# Patient Record
Sex: FEMALE | Race: WHITE | NOT HISPANIC OR LATINO | Employment: FULL TIME | URBAN - METROPOLITAN AREA
[De-identification: names, ages, dates, MRNs, and addresses within clinical notes are randomized per-mention and may not be internally consistent; named-entity substitution may affect disease eponyms.]

---

## 2017-01-11 ENCOUNTER — TELEPHONE (OUTPATIENT)
Dept: SURGERY | Facility: CLINIC | Age: 57
End: 2017-01-11

## 2017-01-11 DIAGNOSIS — C25.9 MALIGNANT NEOPLASM OF PANCREAS, UNSPECIFIED LOCATION OF MALIGNANCY: Primary | ICD-10-CM

## 2017-01-11 NOTE — TELEPHONE ENCOUNTER
----- Message from Sandra Marshall sent at 1/11/2017  2:12 PM CST -----  Contact: Sommer / dr. sanderson office  Sommer States that they need a call returned by a nurse in reference to Citlali. Please call Sommer @ [phone] . Thanks :)

## 2017-01-31 ENCOUNTER — OFFICE VISIT (OUTPATIENT)
Dept: SURGERY | Facility: CLINIC | Age: 57
End: 2017-01-31
Payer: COMMERCIAL

## 2017-01-31 ENCOUNTER — HOSPITAL ENCOUNTER (OUTPATIENT)
Dept: RADIOLOGY | Facility: HOSPITAL | Age: 57
Discharge: HOME | End: 2017-01-31
Attending: SURGERY
Payer: COMMERCIAL

## 2017-01-31 VITALS
WEIGHT: 121.5 LBS | HEART RATE: 98 BPM | BODY MASS INDEX: 24.53 KG/M2 | DIASTOLIC BLOOD PRESSURE: 72 MMHG | TEMPERATURE: 97 F | SYSTOLIC BLOOD PRESSURE: 114 MMHG

## 2017-01-31 DIAGNOSIS — C25.0 MALIGNANT NEOPLASM OF HEAD OF PANCREAS: Primary | ICD-10-CM

## 2017-01-31 DIAGNOSIS — C25.9 MALIGNANT NEOPLASM OF PANCREAS, UNSPECIFIED LOCATION OF MALIGNANCY: ICD-10-CM

## 2017-01-31 PROCEDURE — 3074F SYST BP LT 130 MM HG: CPT | Mod: S$GLB,,, | Performed by: SURGERY

## 2017-01-31 PROCEDURE — 3078F DIAST BP <80 MM HG: CPT | Mod: S$GLB,,, | Performed by: SURGERY

## 2017-01-31 PROCEDURE — 99213 OFFICE O/P EST LOW 20 MIN: CPT | Mod: S$GLB,,, | Performed by: SURGERY

## 2017-01-31 PROCEDURE — 25500020 PHARM REV CODE 255: Performed by: SURGERY

## 2017-01-31 PROCEDURE — 71260 CT THORAX DX C+: CPT | Mod: TC

## 2017-01-31 PROCEDURE — 71260 CT THORAX DX C+: CPT | Mod: 26,,, | Performed by: RADIOLOGY

## 2017-01-31 PROCEDURE — 74177 CT ABD & PELVIS W/CONTRAST: CPT | Mod: TC

## 2017-01-31 PROCEDURE — 74177 CT ABD & PELVIS W/CONTRAST: CPT | Mod: 26,,, | Performed by: RADIOLOGY

## 2017-01-31 PROCEDURE — 99999 PR PBB SHADOW E&M-EST. PATIENT-LVL III: CPT | Mod: PBBFAC,,, | Performed by: SURGERY

## 2017-01-31 RX ORDER — INSULIN LISPRO 100 [IU]/ML
INJECTION, SOLUTION INTRAVENOUS; SUBCUTANEOUS
COMMUNITY

## 2017-01-31 RX ADMIN — IOHEXOL 75 ML: 350 INJECTION, SOLUTION INTRAVENOUS at 12:01

## 2017-01-31 NOTE — LETTER
[facility]  [address]  [facility] [address]  Phone: [phone] February 6, 2017        Cristobal Garrison MD  [address]  [address]  [facility] [address]    Patient: Citlali Rose   MR Number: [id number]   YOB: 1960   Date of Visit: 1/31/2017     Dear Dr. Garrison:    Thank you for referring Citlali Rose to me for evaluation.     I saw Ms. Rose in clinic and reviewed her updated imaging.  We are starting her on a prehab program and tentatively planning for Whipple procedure in early March.    If you have questions, please do not hesitate to call me. Thank you again for referring Ms. Rose.    Sincerely,      EMMANUELLE Winston MD  Surgical Oncology & General Surgery  [facility]    [phone] (cell)    CC  Julian Vargas MD    Enclosure

## 2017-01-31 NOTE — PROGRESS NOTES
"History & Physical    SUBJECTIVE:     History of Present Illness:  Mrs Rose is a pleasant 56 y.o. female who is accompanied by her  after being referred back to our clinic for an updated CT scan and labs and discussion of whipple procedure. She already saw Dr. Winston in the hospital after being transferred from [facility] for painless obstructive jaundice. She reports that she has lost 31 lbs unintentionally since Dec 2015 but only recently developed nausea, anorexia, diarrhea. She started "feeling bad" with some blurred vision in July and has been diagnosed w DM II then. When she came to our hospital, she had an EUS w FNA  and biopsy that revealed adenocarcinoma and ERCP w stent placement. She is now s/p neoadjuvant chemo and chemoradiation, last dose Jan 4. Had significant nausea and dehydration as a result of chemo and was hospitalized a few times for dehydration. Today she endorses weakness (most of day in wheelchair, has a hard time climbing a flight of stairs due to weakness, not SOB), and continued nausea and vomiting. She thinks her nausea has improved slightly over the past two weeks. Her appetite is decreased. She lost a total of ~70 lbs since beginning chemo/rad. She is here today with repeat CT.    No chief complaint on file.      Review of patient's allergies indicates:  No Known Allergies    Current Outpatient Prescriptions   Medication Sig Dispense Refill    insulin lispro (HUMALOG) 100 unit/mL injection Inject into the skin 3 (three) times daily before meals.      promethazine (PHENERGAN) 12.5 MG Tab Take 2 tablets (25 mg total) by mouth every 6 (six) hours as needed. 60 tablet 1    amlodipine (NORVASC) 10 MG tablet Take 10 mg by mouth once daily.      benazepril (LOTENSIN) 40 MG tablet Take 40 mg by mouth once daily.      CARAFATE 100 mg/mL suspension   0    glimepiride (AMARYL) 4 MG tablet TK 1 T PO BID  0    IBUPROFEN (ADVIL ORAL) Take by mouth. As needed for headaches.   "    lipase-protease-amylase 24,000-76,000-120,000 units (CREON) 24,000-76,000 -120,000 unit capsule Take 2 capsules by mouth 3 (three) times daily with meals. Take 1 capsule w snacks 270 capsule 11    metoprolol succinate (TOPROL-XL) 50 MG 24 hr tablet Take 50 mg by mouth once daily.      ondansetron (ZOFRAN) 4 MG tablet Take 1 tablet (4 mg total) by mouth every 6 (six) hours as needed for Nausea. 30 tablet 0    ondansetron (ZOFRAN-ODT) 8 MG TbDL DIS 1 T ON TONGUE PRN N UTD  0    pantoprazole (PROTONIX) 40 MG tablet TK 1 T PO  QD  0    zolpidem (AMBIEN) 5 MG Tab Take 5 mg by mouth nightly as needed (insomnia).       No current facility-administered medications for this visit.        Past Medical History   Diagnosis Date    Abdominal pain     Blood glucose elevated     Decreased appetite     Diabetes mellitus, type 2     Dilation of biliary tract     Elevated LFTs     Gastritis     Hepatic steatosis     HTN (hypertension)     Hyperbilirubinemia     Hypercholesterolemia     Migraine headache     Nausea & vomiting     Obstructive jaundice     Pancreatic mass     Scleral icterus     Scleroderma     Weight loss, unintentional      Past Surgical History   Procedure Laterality Date    Cholecystectomy      Total abdominal hysterectomy w/ bilateral salpingoophorectomy      Tubal ligation       Family History   Problem Relation Age of Onset    COPD Mother     Scleroderma Father     Cervical cancer Paternal Aunt     Lymphoma Paternal Aunt      Social History   Substance Use Topics    Smoking status: Former Smoker     Types: Cigarettes    Smokeless tobacco: Never Used      Comment: quit Feb 2003    Alcohol use Yes      Comment: 1-2 per month        Review of Systems:  Review of Systems   Constitutional: Positive for activity change, appetite change, fatigue and unexpected weight change. Negative for fever.   HENT: Negative.    Respiratory: Negative.    Cardiovascular: Negative.     Gastrointestinal: Positive for abdominal pain and nausea. Negative for blood in stool, constipation, diarrhea and vomiting.   Endocrine: Negative.    Genitourinary: Negative.    Musculoskeletal: Negative.    Neurological: Negative.    Hematological: Negative.        OBJECTIVE:     Vital Signs (Most Recent)  Temp: 97.3 °F (36.3 °C) (01/31/17 1312)  Pulse: 98 (01/31/17 1312)  BP: 114/72 (01/31/17 1312)     55.1 kg (121 lb 7.6 oz)     Physical Exam:  Physical Exam   Constitutional: She is oriented to person, place, and time. She is cooperative. She appears ill. No distress.   Well groomed, very jaundiced   HENT:   Head: Normocephalic and atraumatic.   Mouth/Throat: Uvula is midline, oropharynx is clear and moist and mucous membranes are normal.   Eyes: Conjunctivae and lids are normal. Lids are everted and swept, no foreign bodies found. No scleral icterus.   Neck: Trachea normal, normal range of motion, full passive range of motion without pain and phonation normal. Neck supple. No thyroid mass and no thyromegaly present.   Cardiovascular: Normal rate, regular rhythm, S1 normal, S2 normal and normal heart sounds.    Pulses:       Radial pulses are 2+ on the right side, and 2+ on the left side.        Dorsalis pedis pulses are 2+ on the right side, and 2+ on the left side.   Slight non pitting ankle edema bilaterally   Pulmonary/Chest: Effort normal and breath sounds normal.   Abdominal: Soft. Bowel sounds are decreased. There is no hepatosplenomegaly. There is tenderness in the epigastric area. No hernia.       Musculoskeletal:   In wheelchair   Lymphadenopathy:     She has no cervical adenopathy.        Right: No supraclavicular adenopathy present.        Left: No supraclavicular adenopathy present.   Neurological: She is alert and oriented to person, place, and time. She has normal strength. Coordination and gait normal.   Skin: Skin is warm, dry and intact. No rash noted. She is not diaphoretic. No cyanosis.  Nails show no clubbing.   Psychiatric: She has a normal mood and affect. Her speech is normal and behavior is normal. Judgment and thought content normal. Cognition and memory are normal.       Laboratory  Ca 19-9: 666 from 1311  Prealbumin: 7    Diagnostic Results:  CT pancreas protocol reviewed    IMPR: Adenocarcinoma in head of pancreas abutting SMA/SMV, now s/p neoadjuvant chemo/rad    PLAN:  Needs to improve functional status with protein/Boost supplements; needs to walk at least 30 minutes per day  Set up appt with Lynda to find a strategy for strength and dietary improvement on Friday 2/3/17  Tentatively plan for Whipple with vein reconstruction March 3        I have personally seen/examined this patient and agree with the resident/NP findings.    S/p neoadjuvant  Updated scans without signs of metastatic dz  Somewhat debilitated  Will start prehab and plan for whipple

## 2017-02-03 ENCOUNTER — OFFICE VISIT (OUTPATIENT)
Dept: SURGERY | Facility: CLINIC | Age: 57
End: 2017-02-03
Payer: COMMERCIAL

## 2017-02-03 ENCOUNTER — LAB VISIT (OUTPATIENT)
Dept: LAB | Facility: HOSPITAL | Age: 57
End: 2017-02-03
Payer: COMMERCIAL

## 2017-02-03 VITALS
DIASTOLIC BLOOD PRESSURE: 75 MMHG | SYSTOLIC BLOOD PRESSURE: 114 MMHG | HEART RATE: 91 BPM | WEIGHT: 119.94 LBS | BODY MASS INDEX: 24.18 KG/M2 | HEIGHT: 59 IN

## 2017-02-03 DIAGNOSIS — K86.89 PANCREATIC INSUFFICIENCY: ICD-10-CM

## 2017-02-03 DIAGNOSIS — E55.9 VITAMIN D DEFICIENCY: ICD-10-CM

## 2017-02-03 DIAGNOSIS — E11.9 DIABETES MELLITUS TYPE 2, DIET-CONTROLLED: ICD-10-CM

## 2017-02-03 DIAGNOSIS — C25.0 MALIGNANT NEOPLASM OF HEAD OF PANCREAS: Primary | ICD-10-CM

## 2017-02-03 DIAGNOSIS — E46 PROTEIN CALORIE MALNUTRITION: ICD-10-CM

## 2017-02-03 LAB — 25(OH)D3+25(OH)D2 SERPL-MCNC: 9 NG/ML

## 2017-02-03 PROCEDURE — 3044F HG A1C LEVEL LT 7.0%: CPT | Mod: S$GLB,,, | Performed by: NURSE PRACTITIONER

## 2017-02-03 PROCEDURE — 99999 PR PBB SHADOW E&M-EST. PATIENT-LVL III: CPT | Mod: PBBFAC,,, | Performed by: NURSE PRACTITIONER

## 2017-02-03 PROCEDURE — 83036 HEMOGLOBIN GLYCOSYLATED A1C: CPT

## 2017-02-03 PROCEDURE — 3074F SYST BP LT 130 MM HG: CPT | Mod: S$GLB,,, | Performed by: NURSE PRACTITIONER

## 2017-02-03 PROCEDURE — 3060F POS MICROALBUMINURIA REV: CPT | Mod: S$GLB,,, | Performed by: NURSE PRACTITIONER

## 2017-02-03 PROCEDURE — 82306 VITAMIN D 25 HYDROXY: CPT

## 2017-02-03 PROCEDURE — 99214 OFFICE O/P EST MOD 30 MIN: CPT | Mod: S$GLB,,, | Performed by: NURSE PRACTITIONER

## 2017-02-03 PROCEDURE — 3078F DIAST BP <80 MM HG: CPT | Mod: S$GLB,,, | Performed by: NURSE PRACTITIONER

## 2017-02-03 PROCEDURE — 2022F DILAT RTA XM EVC RTNOPTHY: CPT | Mod: S$GLB,,, | Performed by: NURSE PRACTITIONER

## 2017-02-03 PROCEDURE — 84590 ASSAY OF VITAMIN A: CPT

## 2017-02-03 RX ORDER — PANTOPRAZOLE SODIUM 40 MG/1
40 TABLET, DELAYED RELEASE ORAL DAILY
Qty: 30 TABLET | Refills: 11 | Status: SHIPPED | OUTPATIENT
Start: 2017-02-03 | End: 2018-02-03

## 2017-02-03 NOTE — PROGRESS NOTES
Mrs Rose is a pleasant 56 y.o. female who is accompanied by her  after being referred to me by Dr. Winston for prehab, nutritional and functional improvement. She is now s/p neoadjuvant chemo and chemoradiation, last dose Jan 4. Had significant nausea and dehydration as a result of chemo and was hospitalized a few times for dehydration. At last visit, she endorsed weakness (most of day in wheelchair, has a hard time climbing a flight of stairs due to weakness, not SOB), and continued nausea and vomiting. She thinks her nausea has improved slightly over the past two weeks. Her appetite is decreased and she is not forcing herself to eat protein or more food as it causes her diarrhea and digestive problems. Yesterday she had a 1/4 hamburger, then had some potted meat, crackers, cucumber and tomato. She lost a total of ~70 lbs since beginning chemo/rad and has lost 2 more lbs since her visit w Dr. Winston on Jan 31, 17. Her last CT scan results are:     1/31/16 CT ABD/PELV: Approximate 3 cm pancreatic head mass without significant change in size from prior exam. Tumor abuts superior mesenteric artery and vein without definite encasement. No distant metastatic disease identified.Improved bile duct dilatation status post stent placement. Fatty liver. Chronic, mild left pleural effusion. Ascending colon shows wall thickening with increased enhancement suggesting colitis. Correlate clinically. Mild ascites, status post cholecystectomy and hysterectomy, and other findings as above.    Review of Systems:  Review of Systems   Constitutional: Positive for activity change, appetite change, fatigue and unexpected weight change. Negative for fever.   HENT: Negative.   Respiratory: Negative.   Cardiovascular: Negative.   Gastrointestinal: Positive for abdominal pain and nausea. Negative for blood in stool, constipation, diarrhea and vomiting. Has diarrhea if eating anything w protein or fat.   Endocrine: Negative.    Genitourinary: Negative.   Musculoskeletal: Negative.   Neurological: Negative.   Hematological: Negative.     Physical Exam:  Physical Exam   Constitutional: She is oriented to person, place, and time. She is cooperative. She appears ill. No distress.   Well groomed, overall pallor and fatigued appearing, not in a wheelchair today  HENT:   Head: Normocephalic and atraumatic.   Mouth/Throat: Uvula is midline, oropharynx is clear and moist and mucous membranes are normal.   Eyes: Conjunctivae and lids are normal. Lids are everted and swept, no foreign bodies found. No scleral icterus.   Neck: Trachea normal, normal range of motion, full passive range of motion without pain and phonation normal. Neck supple. No thyroid mass and no thyromegaly present.   Cardiovascular: Normal rate, regular rhythm, S1 normal, S2 normal and normal heart sounds. No edema  Pulses:  Radial pulses are 2+ on the right side, and 2+ on the left side.   Dorsalis pedis pulses are 2+ on the right side, and 2+ on the left side.   Pulmonary/Chest: Effort normal and breath sounds normal.   Abdominal: Soft. Bowel sounds are decreased. There is no hepatosplenomegaly. There is tenderness in the epigastric area. No hernia.           Musculoskeletal: ROM WNL, ambulates well if slowly  Lymphadenopathy:   She has no cervical adenopathy.   Right: No supraclavicular adenopathy present.   Left: No supraclavicular adenopathy present.   Neurological: She is alert and oriented to person, place, and time. She has normal strength. Coordination and gait normal.   Skin: Skin is warm, dry and intact. No rash noted. She is not diaphoretic. No cyanosis. Nails show no clubbing.   Psychiatric: She has a normal mood and affect. Her speech is normal and behavior is normal. Judgment and thought content normal. Cognition and memory are normal.     FUNCTIONAL STATUS: cardiac mets ~4, independent ADLs    :   15.5 kg RH (frail)  14.0 kg  LH  (frail)    TUG:  10.32 secs       SIMPLIFIED FRAILTY: 3/5 moderate risk    CCI: 5      She states she has been feeling better in the last couple of days but still has significant nausea when trying to eat. Also has trouble ingesting large pills and protein drinks cause her diarrhea. She and I talked about our Prehab interventions. We discussed how to take PERT in food, increasing protein and taking creon w it as well, vitamin supplementation, increasing activity- she has chosen to use her stationary bike. She has severe protein calorie malnutrition and is not a surgical candidate as she is very malnourished and is frail for her age  We discussed the Whipple w venous reconstruction briefly today.        ASSESSMENT: resectable pancreatic cancer, protein calorie malnutrition, pancreatic insufficiency      PLAN:  1. Prehab interventions as discussed  2. RTC in 2 weeks for check up of malnutrition/functional status

## 2017-02-04 LAB
ESTIMATED AVG GLUCOSE: 143 MG/DL
HBA1C MFR BLD HPLC: 6.6 %

## 2017-02-07 LAB — VIT A SERPL-MCNC: <13 UG/DL (ref 38–106)

## 2017-02-14 DIAGNOSIS — C25.9 MALIGNANT NEOPLASM OF PANCREAS, UNSPECIFIED LOCATION OF MALIGNANCY: Primary | ICD-10-CM

## 2017-02-16 ENCOUNTER — OFFICE VISIT (OUTPATIENT)
Dept: SURGERY | Facility: CLINIC | Age: 57
End: 2017-02-16
Payer: COMMERCIAL

## 2017-02-16 ENCOUNTER — RESEARCH ENCOUNTER (OUTPATIENT)
Dept: RESEARCH | Facility: HOSPITAL | Age: 57
End: 2017-02-16

## 2017-02-16 VITALS
HEART RATE: 101 BPM | DIASTOLIC BLOOD PRESSURE: 73 MMHG | SYSTOLIC BLOOD PRESSURE: 115 MMHG | HEIGHT: 59 IN | BODY MASS INDEX: 24.71 KG/M2 | WEIGHT: 122.56 LBS

## 2017-02-16 DIAGNOSIS — E46 PROTEIN CALORIE MALNUTRITION: ICD-10-CM

## 2017-02-16 DIAGNOSIS — K86.89 PANCREATIC INSUFFICIENCY: Primary | ICD-10-CM

## 2017-02-16 DIAGNOSIS — E55.9 VITAMIN D DEFICIENCY: ICD-10-CM

## 2017-02-16 DIAGNOSIS — C25.0 MALIGNANT NEOPLASM OF HEAD OF PANCREAS: ICD-10-CM

## 2017-02-16 PROCEDURE — 99213 OFFICE O/P EST LOW 20 MIN: CPT | Mod: S$GLB,,, | Performed by: NURSE PRACTITIONER

## 2017-02-16 PROCEDURE — 3078F DIAST BP <80 MM HG: CPT | Mod: S$GLB,,, | Performed by: NURSE PRACTITIONER

## 2017-02-16 PROCEDURE — 3074F SYST BP LT 130 MM HG: CPT | Mod: S$GLB,,, | Performed by: NURSE PRACTITIONER

## 2017-02-16 PROCEDURE — 99999 PR PBB SHADOW E&M-EST. PATIENT-LVL III: CPT | Mod: PBBFAC,,, | Performed by: NURSE PRACTITIONER

## 2017-02-16 NOTE — PROGRESS NOTES
Pt and one family member were approached by Wendy Reynoso in clinic regarding participation in [employer] program (IRB#[id number]). Pt was agreeable.   The ICF was reviewed with pt. The discussion included:   - participation is voluntary;   - pt can change her mind about participating at any time;   - if she changes her mind about participation, she can call us at contact info in ICF and we will discard samples remaining;   - samples that have been used prior to her notification will still be included in research;   - specimens collected will include blood, urine and tissue;   - blood and urine will be collected pre-operatively if possible;   - tissue will be collected from Pathology after routine tests have been conducted;   - Dr. Winston will perform procedure per his usual protocol - participation in [employer] program will not change amount of tissue removed;   - specimens will be stored with unique code that can only be linked to pt by [employer] staff;   - all medical information released to researchers will be stripped of identifiers;   - samples will not be released to outside researchers unless approved by internal committee;   - there is a small risk of loss of confidentiality, but we make every effort to ensure privacy;   - no other physical risks outside of those involved in standard of care procedure.     Pt did not have any questions. Pt willingly and independently signed ICF for [employer]. A copy of signed ICF was given to pt with instructions to call with any questions that may arise or if she should change her mind regarding participation in [employer] program.

## 2017-02-16 NOTE — PATIENT INSTRUCTIONS
PLAN:    1. Take Vitamin D3 5,000U/day    2. Multivitamin w Iron     3. Whey protein 25g twice per day. One choice can be Ensure high protein another can be whey protein in a drink or smoothie.     4. Walk 30 minutes/day    5. Take creon tablets 24,000 U dose X 2 with each meal (if a full meal). Take 1 tablet w a small meal.

## 2017-02-16 NOTE — MR AVS SNAPSHOT
[facility]  [address]  [place] [address]  Phone: [phone]                  Citlali Rose   2017 9:00 AM   Office Visit    Description:  Female : 1960   Provider:  Lynda Cardenas NP   Department:  [facility]           Reason for Visit     Pre-op Exam                To Do List           Your Future Surgeries/Procedures     Mar 03, 2017   Surgery with EMMANUELLE Winston MD   [facility]    [address] [address] [phone]              Goals (5 Years of Data)     None      [facility]     [facility] -  Assistance  Registered nurses in the [facility] provide clinical advisement, health education, appointment booking, and other advisory services.  Call for this free service at [phone].             Medications           Message regarding Medications     Verify the changes and/or additions to your medication regime listed below are the same as discussed with your clinician today.  If any of these changes or additions are incorrect, please notify your healthcare provider.             Verify that the below list of medications is an accurate representation of the medications you are currently taking.  If none reported, the list may be blank. If incorrect, please contact your healthcare provider. Carry this list with you in case of emergency.           Current Medications     glimepiride (AMARYL) 4 MG tablet TK 1 T PO BID    insulin lispro (HUMALOG) 100 unit/mL injection Inject into the skin 3 (three) times daily before meals.    lipase-protease-amylase 24,000-76,000-120,000 units (CREON) 24,000-76,000 -120,000 unit capsule Take 2 capsules by mouth 3 (three) times daily with meals. Take 1 capsule w snacks    pantoprazole (PROTONIX) 40 MG tablet Take 1 tablet (40 mg total) by mouth once daily.    promethazine (PHENERGAN) 12.5 MG Tab Take 2  "tablets (25 mg total) by mouth every 6 (six) hours as needed.           Clinical Reference Information           Your Vitals Were     BP Pulse Height Weight BMI    115/73 (BP Location: Right arm, Patient Position: Sitting, BP Method: Automatic) 101 4' 11" (1.499 m) 55.6 kg (122 lb 9.2 oz) 24.76 kg/m2      Blood Pressure          Most Recent Value    BP  115/73      Allergies as of 2/16/2017     Zofran [Ondansetron Hcl (Pf)]      Immunizations Administered on Date of Encounter - 2/16/2017     None      Instructions    PLAN:    1. Take Vitamin D3 5,000U/day    2. Multivitamin w Iron     3. Whey protein 25g twice per day. One choice can be Ensure high protein another can be whey protein in a drink or smoothie.     4. Walk 30 minutes/day    5. Take creon tablets 24,000 U dose X 2 with each meal (if a full meal). Take 1 tablet w a small meal.       Language Assistance Services     ATTENTION: Language assistance services are available, free of charge. Please call [phone].      ATENCIÓN: Si habla norbert, tiene a najera disposición servicios gratuitos de asistencia lingüística. Llame al [phone].     ELENA Ý: N?u b?n nói Ti?ng Vi?t, có các d?ch v? h? tr? ngôn ng? mi?n phí dành cho b?n. G?i s? [phone].         [facility] complies with applicable Federal civil rights laws and does not discriminate on the basis of race, color, national origin, age, disability, or sex.        "

## 2017-02-16 NOTE — PROGRESS NOTES
Mrs Rose is a pleasant 56 y.o. female who is accompanied by her  after being referred to me by Dr. Winston for prehab, nutritional and functional improvement. She is now s/p neoadjuvant chemo and chemoradiation, last dose Jan 4. Had significant nausea and dehydration as a result of chemo and was hospitalized a few times for dehydration     At last visit, she had agreed to participate in the prehab program and has been using creon but not supplementing w protein and still having occ nausea. She has not been taking supplementary Vit D either or vitamins to make up for her nutritional insufficiencies.  We discussed her recent labs which indicated low prealb, alb, Vit D and A (indicating panc insufficiency) and her data on  strength and walking. She has agreed to follow the recommendations and has recommitted. I advised her that she is functionally not a candidate for surgery until we see some progression and alb is ~ 2.8 or pre-alb is 18.     Her last CT scan results are:      1/31/16 CT ABD/PELV: Approximate 3 cm pancreatic head mass without significant change in size from prior exam. Tumor abuts superior mesenteric artery and vein without definite encasement. No distant metastatic disease identified.Improved bile duct dilatation status post stent placement. Fatty liver. Chronic, mild left pleural effusion. Ascending colon shows wall thickening with increased enhancement suggesting colitis. Correlate clinically. Mild ascites, status post cholecystectomy and hysterectomy, and other findings as above.   Review of Systems:  Review of Systems   Constitutional: Positive for activity change, appetite change, fatigue and unexpected weight change. Negative for fever. She has improved since last visit but not significantly.   HENT: Negative.   Respiratory: Negative.   Cardiovascular: Negative.   Gastrointestinal: Positive for abdominal pain and nausea. Negative for blood in stool, constipation, diarrhea and  vomiting. Has diarrhea if eating anything w protein or fat.   Endocrine: Negative.   Genitourinary: Negative.   Musculoskeletal: Negative.   Neurological: Negative.   Hematological: Negative.      Physical Exam:  Physical Exam   Constitutional: She is oriented to person, place, and time. She is cooperative. She appears ill. No distress.   Well groomed, overall pallor and fatigued appearing, not in a wheelchair today, slight improvement  HENT:   Head: Normocephalic and atraumatic.   Mouth/Throat: Uvula is midline, oropharynx is clear and moist and mucous membranes are normal.   Eyes: Conjunctivae and lids are normal. Lids are everted and swept, no foreign bodies found. No scleral icterus.   Neck: Trachea normal, normal range of motion, full passive range of motion without pain and phonation normal. Neck supple. No thyroid mass and no thyromegaly present.   Cardiovascular: Normal rate, regular rhythm, S1 normal, S2 normal and normal heart sounds. No edema  Pulses:  Radial pulses are 2+ on the right side, and 2+ on the left side.   Dorsalis pedis pulses are 2+ on the right side, and 2+ on the left side.   Pulmonary/Chest: Effort normal and breath sounds normal.   Abdominal: Soft. Bowel sounds are decreased. There is no hepatosplenomegaly. There is tenderness in the epigastric area. No hernia    ASSESSMENT: Physical deconditioning, protein calorie malnutrition, pancreatic insufficiency    PLAN:    1. prehab continue, see me before surgery- updated labs: Vit D, A, prealb, CMP  2. If not enough progression, decision to delay surgery at next visit.

## 2017-02-24 ENCOUNTER — LAB VISIT (OUTPATIENT)
Dept: LAB | Facility: HOSPITAL | Age: 57
End: 2017-02-24
Payer: COMMERCIAL

## 2017-02-24 ENCOUNTER — OFFICE VISIT (OUTPATIENT)
Dept: SURGERY | Facility: CLINIC | Age: 57
End: 2017-02-24
Payer: COMMERCIAL

## 2017-02-24 VITALS
BODY MASS INDEX: 24.22 KG/M2 | HEIGHT: 59 IN | WEIGHT: 120.13 LBS | SYSTOLIC BLOOD PRESSURE: 109 MMHG | HEART RATE: 85 BPM | DIASTOLIC BLOOD PRESSURE: 63 MMHG

## 2017-02-24 DIAGNOSIS — C25.0 MALIGNANT NEOPLASM OF HEAD OF PANCREAS: ICD-10-CM

## 2017-02-24 DIAGNOSIS — E55.9 VITAMIN D DEFICIENCY: ICD-10-CM

## 2017-02-24 DIAGNOSIS — E46 MALNUTRITION: Primary | ICD-10-CM

## 2017-02-24 DIAGNOSIS — E46 MALNUTRITION: ICD-10-CM

## 2017-02-24 LAB
25(OH)D3+25(OH)D2 SERPL-MCNC: 12 NG/ML
ALBUMIN SERPL BCP-MCNC: 2.6 G/DL
ALP SERPL-CCNC: 119 U/L
ALT SERPL W/O P-5'-P-CCNC: 19 U/L
ANION GAP SERPL CALC-SCNC: 7 MMOL/L
AST SERPL-CCNC: 56 U/L
BASOPHILS # BLD AUTO: 0.01 K/UL
BASOPHILS NFR BLD: 0.3 %
BILIRUB SERPL-MCNC: 2.5 MG/DL
BUN SERPL-MCNC: 4 MG/DL
CALCIUM SERPL-MCNC: 8.7 MG/DL
CHLORIDE SERPL-SCNC: 103 MMOL/L
CO2 SERPL-SCNC: 29 MMOL/L
CREAT SERPL-MCNC: 0.6 MG/DL
DIFFERENTIAL METHOD: ABNORMAL
EOSINOPHIL # BLD AUTO: 0 K/UL
EOSINOPHIL NFR BLD: 0.5 %
ERYTHROCYTE [DISTWIDTH] IN BLOOD BY AUTOMATED COUNT: 14.9 %
EST. GFR  (AFRICAN AMERICAN): >60 ML/MIN/1.73 M^2
EST. GFR  (NON AFRICAN AMERICAN): >60 ML/MIN/1.73 M^2
GLUCOSE SERPL-MCNC: 163 MG/DL
HCT VFR BLD AUTO: 29 %
HGB BLD-MCNC: 10 G/DL
LYMPHOCYTES # BLD AUTO: 0.5 K/UL
LYMPHOCYTES NFR BLD: 12.2 %
MCH RBC QN AUTO: 35.2 PG
MCHC RBC AUTO-ENTMCNC: 34.5 %
MCV RBC AUTO: 102 FL
MONOCYTES # BLD AUTO: 0.4 K/UL
MONOCYTES NFR BLD: 11.4 %
NEUTROPHILS # BLD AUTO: 2.8 K/UL
NEUTROPHILS NFR BLD: 75.3 %
PLATELET # BLD AUTO: 138 K/UL
PMV BLD AUTO: 9.9 FL
POTASSIUM SERPL-SCNC: 3.5 MMOL/L
PREALB SERPL-MCNC: 6 MG/DL
PROT SERPL-MCNC: 6.7 G/DL
RBC # BLD AUTO: 2.84 M/UL
SODIUM SERPL-SCNC: 139 MMOL/L
WBC # BLD AUTO: 3.68 K/UL

## 2017-02-24 PROCEDURE — 99999 PR PBB SHADOW E&M-EST. PATIENT-LVL III: CPT | Mod: PBBFAC,,, | Performed by: NURSE PRACTITIONER

## 2017-02-24 PROCEDURE — 1160F RVW MEDS BY RX/DR IN RCRD: CPT | Mod: S$GLB,,, | Performed by: NURSE PRACTITIONER

## 2017-02-24 PROCEDURE — 3074F SYST BP LT 130 MM HG: CPT | Mod: S$GLB,,, | Performed by: NURSE PRACTITIONER

## 2017-02-24 PROCEDURE — 82306 VITAMIN D 25 HYDROXY: CPT

## 2017-02-24 PROCEDURE — 3078F DIAST BP <80 MM HG: CPT | Mod: S$GLB,,, | Performed by: NURSE PRACTITIONER

## 2017-02-24 PROCEDURE — 84134 ASSAY OF PREALBUMIN: CPT

## 2017-02-24 PROCEDURE — 85025 COMPLETE CBC W/AUTO DIFF WBC: CPT

## 2017-02-24 PROCEDURE — 84590 ASSAY OF VITAMIN A: CPT

## 2017-02-24 PROCEDURE — 80053 COMPREHEN METABOLIC PANEL: CPT

## 2017-02-24 PROCEDURE — 99213 OFFICE O/P EST LOW 20 MIN: CPT | Mod: S$GLB,,, | Performed by: NURSE PRACTITIONER

## 2017-02-24 RX ORDER — ERGOCALCIFEROL 1.25 MG/1
50000 CAPSULE ORAL
COMMUNITY
End: 2017-03-10 | Stop reason: DRUGHIGH

## 2017-02-24 NOTE — LETTER
[facility]  [address]  [facility] [address]  Phone: [phone] February 24, 2017     Patient: Citlali Rose   YOB: 1960   Date of Visit: 2/24/2017       To Whom It May Concern:    This pt is under my care and will be undergoing a surgical procedure for pancreatic cancer on 3/13/17. This procedure will require an extensive recovery of 6 weeks.     If you have any questions or concerns, please don't hesitate to contact my office.    Sincerely,        Lynda Cardenas NP

## 2017-02-25 NOTE — PROGRESS NOTES
Mrs Rose is a pleasant 56 y.o. female who is accompanied by her  after being referred to me by Dr. Winston for prehab, nutritional and functional improvement. She is now s/p neoadjuvant chemo and chemoradiation, last dose Jan 4. Since last week, she only had one episode of vomiting, which has bene an improvement since previous weeks.She is aware she is not presently a candidate for surgery due to her poor functional status and she has bene trying to be more active, eat more and follow the program. She does state she feels better since last visit.      Her last CT scan results are:       1/31/16 CT ABD/PELV: Approximate 3 cm pancreatic head mass without significant change in size from prior exam. Tumor abuts superior mesenteric artery and vein without definite encasement. No distant metastatic disease identified.Improved bile duct dilatation status post stent placement. Fatty liver. Chronic, mild left pleural effusion. Ascending colon shows wall thickening with increased enhancement suggesting colitis. Correlate clinically. Mild ascites, status post cholecystectomy and hysterectomy, and other findings as above.     We discussed that we will get follow up labs today to check for progress. She has not gained weight but lost, although she says she feels better and has a little more energy. I advised that we will push he surgery back about 10 days to see if we can make any headway in her function and nutrition. We performed a  strength today and there is improvement in both her  strength and TUG test, which does show she has been doing her part.       18.6 kg, 15.4kg    TUG 8.17secs     Review of Systems:  Review of Systems   Constitutional: Positive for activity change, appetite change, fatigue and unexpected weight change. Negative for fever. She has improved since last visit but not significantly.   HENT: Negative.   Respiratory: Negative.   Cardiovascular: Negative.   Gastrointestinal: Positive for  nausea. Negative for blood in stool, constipation, diarrhea. Has diarrhea if eating anything w protein or fat. Taking creon and it is helping her eat and not have digestive problems  Endocrine: Negative.   Genitourinary: Negative.   Musculoskeletal: Negative.   Neurological: Negative.   Hematological: Negative.       Physical Exam:  Physical Exam   Constitutional: She is oriented to person, place, and time. She is cooperative. She appears ill. No distress.   Well groomed, overall pallor and fatigued appearing, not in a wheelchair today, slight improvement  HENT:   Head: Normocephalic and atraumatic.   Mouth/Throat: Uvula is midline, oropharynx is clear and moist and mucous membranes are normal.   Eyes: Conjunctivae and lids are normal. Lids are everted and swept, no foreign bodies found. No scleral icterus.   Neck: Trachea normal, normal range of motion, full passive range of motion without pain and phonation normal. Neck supple. No thyroid mass and no thyromegaly present.   Cardiovascular: Normal rate, regular rhythm, S1 normal, S2 normal and normal heart sounds. No edema  Pulses:  Radial pulses are 2+ on the right side, and 2+ on the left side.   Dorsalis pedis pulses are 2+ on the right side, and 2+ on the left side.   Pulmonary/Chest: Effort normal and breath sounds normal.   Abdominal: Soft. Bowel sounds are decreased. There is no hepatosplenomegaly. There is tenderness in the epigastric area. No hernia      ASSESSMENT: slight improvement in function, no weight gain yet    PLAN:    1. Postpone surgery for about 10 days- Carmencita and I discussed in clinic today and I told Carmencita that Mrs Rose is aware we will be postponing surgery> Shonda will follow up with her for a new date.  2. When RTC for updated labs and final pre-op- ge updated CA 19-9, Vit D & A, prealb.

## 2017-03-01 LAB — VIT A SERPL-MCNC: <13 UG/DL (ref 38–106)

## 2017-03-02 ENCOUNTER — OFFICE VISIT (OUTPATIENT)
Dept: SURGERY | Facility: CLINIC | Age: 57
End: 2017-03-02
Payer: COMMERCIAL

## 2017-03-02 ENCOUNTER — LAB VISIT (OUTPATIENT)
Dept: LAB | Facility: HOSPITAL | Age: 57
End: 2017-03-02
Attending: SURGERY
Payer: COMMERCIAL

## 2017-03-02 VITALS
DIASTOLIC BLOOD PRESSURE: 70 MMHG | BODY MASS INDEX: 24.09 KG/M2 | TEMPERATURE: 98 F | HEART RATE: 102 BPM | HEIGHT: 59 IN | WEIGHT: 119.5 LBS | SYSTOLIC BLOOD PRESSURE: 112 MMHG

## 2017-03-02 DIAGNOSIS — K86.89 PANCREATIC INSUFFICIENCY: ICD-10-CM

## 2017-03-02 DIAGNOSIS — E46 PROTEIN CALORIE MALNUTRITION: ICD-10-CM

## 2017-03-02 DIAGNOSIS — C25.9 MALIGNANT NEOPLASM OF PANCREAS, UNSPECIFIED LOCATION OF MALIGNANCY: Primary | ICD-10-CM

## 2017-03-02 DIAGNOSIS — C25.0 MALIGNANT NEOPLASM OF HEAD OF PANCREAS: Primary | ICD-10-CM

## 2017-03-02 DIAGNOSIS — C25.9 MALIGNANT NEOPLASM OF PANCREAS, UNSPECIFIED LOCATION OF MALIGNANCY: ICD-10-CM

## 2017-03-02 LAB
25(OH)D3+25(OH)D2 SERPL-MCNC: 18 NG/ML
ALBUMIN SERPL BCP-MCNC: 2.5 G/DL
ALP SERPL-CCNC: 122 U/L
ALT SERPL W/O P-5'-P-CCNC: 22 U/L
ANION GAP SERPL CALC-SCNC: 8 MMOL/L
AST SERPL-CCNC: 61 U/L
BASOPHILS # BLD AUTO: 0.01 K/UL
BASOPHILS NFR BLD: 0.2 %
BILIRUB SERPL-MCNC: 2 MG/DL
BUN SERPL-MCNC: 3 MG/DL
CALCIUM SERPL-MCNC: 8.9 MG/DL
CANCER AG19-9 SERPL-ACNC: 774 U/ML
CHLORIDE SERPL-SCNC: 102 MMOL/L
CO2 SERPL-SCNC: 28 MMOL/L
CREAT SERPL-MCNC: 0.6 MG/DL
DIFFERENTIAL METHOD: ABNORMAL
EOSINOPHIL # BLD AUTO: 0 K/UL
EOSINOPHIL NFR BLD: 0.5 %
ERYTHROCYTE [DISTWIDTH] IN BLOOD BY AUTOMATED COUNT: 14.7 %
EST. GFR  (AFRICAN AMERICAN): >60 ML/MIN/1.73 M^2
EST. GFR  (NON AFRICAN AMERICAN): >60 ML/MIN/1.73 M^2
GLUCOSE SERPL-MCNC: 189 MG/DL
HCT VFR BLD AUTO: 29.3 %
HGB BLD-MCNC: 10.1 G/DL
LYMPHOCYTES # BLD AUTO: 0.5 K/UL
LYMPHOCYTES NFR BLD: 12.9 %
MCH RBC QN AUTO: 35.3 PG
MCHC RBC AUTO-ENTMCNC: 34.5 %
MCV RBC AUTO: 102 FL
MONOCYTES # BLD AUTO: 0.3 K/UL
MONOCYTES NFR BLD: 6 %
NEUTROPHILS # BLD AUTO: 3.4 K/UL
NEUTROPHILS NFR BLD: 80.4 %
PLATELET # BLD AUTO: 146 K/UL
PMV BLD AUTO: 10 FL
POTASSIUM SERPL-SCNC: 3.2 MMOL/L
PREALB SERPL-MCNC: 7 MG/DL
PROT SERPL-MCNC: 6.5 G/DL
RBC # BLD AUTO: 2.86 M/UL
SODIUM SERPL-SCNC: 138 MMOL/L
WBC # BLD AUTO: 4.18 K/UL

## 2017-03-02 PROCEDURE — 86301 IMMUNOASSAY TUMOR CA 19-9: CPT

## 2017-03-02 PROCEDURE — 82306 VITAMIN D 25 HYDROXY: CPT

## 2017-03-02 PROCEDURE — 84134 ASSAY OF PREALBUMIN: CPT

## 2017-03-02 PROCEDURE — 3078F DIAST BP <80 MM HG: CPT | Mod: S$GLB,,, | Performed by: NURSE PRACTITIONER

## 2017-03-02 PROCEDURE — 1160F RVW MEDS BY RX/DR IN RCRD: CPT | Mod: S$GLB,,, | Performed by: NURSE PRACTITIONER

## 2017-03-02 PROCEDURE — 80053 COMPREHEN METABOLIC PANEL: CPT

## 2017-03-02 PROCEDURE — 99214 OFFICE O/P EST MOD 30 MIN: CPT | Mod: S$GLB,,, | Performed by: NURSE PRACTITIONER

## 2017-03-02 PROCEDURE — 3074F SYST BP LT 130 MM HG: CPT | Mod: S$GLB,,, | Performed by: NURSE PRACTITIONER

## 2017-03-02 PROCEDURE — 36415 COLL VENOUS BLD VENIPUNCTURE: CPT

## 2017-03-02 PROCEDURE — 85025 COMPLETE CBC W/AUTO DIFF WBC: CPT

## 2017-03-02 PROCEDURE — 84590 ASSAY OF VITAMIN A: CPT

## 2017-03-02 PROCEDURE — 99999 PR PBB SHADOW E&M-EST. PATIENT-LVL III: CPT | Mod: PBBFAC,,, | Performed by: NURSE PRACTITIONER

## 2017-03-02 RX ORDER — OXYCODONE AND ACETAMINOPHEN 5; 325 MG/1; MG/1
1 TABLET ORAL EVERY 6 HOURS PRN
Qty: 40 TABLET | Refills: 0 | Status: SHIPPED | OUTPATIENT
Start: 2017-03-02 | End: 2017-04-10 | Stop reason: ALTCHOICE

## 2017-03-08 LAB — VIT A SERPL-MCNC: <13 UG/DL (ref 38–106)

## 2017-03-09 DIAGNOSIS — C25.0 MALIGNANT NEOPLASM OF HEAD OF PANCREAS: ICD-10-CM

## 2017-03-09 DIAGNOSIS — C25.9 MALIGNANT NEOPLASM OF PANCREAS, UNSPECIFIED LOCATION OF MALIGNANCY: Primary | ICD-10-CM

## 2017-03-09 RX ORDER — SODIUM CHLORIDE 9 MG/ML
INJECTION, SOLUTION INTRAVENOUS CONTINUOUS
Status: CANCELLED | OUTPATIENT
Start: 2017-03-09

## 2017-03-09 RX ORDER — ENOXAPARIN SODIUM 100 MG/ML
40 INJECTION SUBCUTANEOUS EVERY 24 HOURS
Status: CANCELLED | OUTPATIENT
Start: 2017-03-09

## 2017-03-10 ENCOUNTER — TELEPHONE (OUTPATIENT)
Dept: SURGERY | Facility: CLINIC | Age: 57
End: 2017-03-10

## 2017-03-10 PROBLEM — C25.9 MALIGNANT NEOPLASM OF PANCREAS: Status: ACTIVE | Noted: 2017-03-10

## 2017-03-10 RX ORDER — CHOLECALCIFEROL (VITAMIN D3) 50 MCG
2 TABLET ORAL EVERY MORNING
COMMUNITY

## 2017-03-10 NOTE — PRE-PROCEDURE INSTRUCTIONS
Spoke with Patient.  NPO, medication, and pre-op instructions reviewed.  Denies previous problems with Anesthesia.  Stated that she checks her glucose at varying times of the day.  Stated that her glucose last evening before dinner was 122.  Stated that she rarely takes Protonix.  Verbalized understanding of instructions.

## 2017-03-10 NOTE — PROGRESS NOTES
Mrs Rose is a pleasant 56 y.o. female who is accompanied by her  after being referred to me by Dr. Winston for prehab, nutritional and functional improvement. She is now s/p neoadjuvant chemo and chemoradiation, last dose Jan 4. We have been working on controlling nausea, getting weight back on her, increasing nutrition and activity and supplementation. In the last week prior to this, she had increased nausea again and more vomiting and has still been trying to move and progress but says she is quite fatigued. She is not able to take in much food or protein, even shakes as she states she can't tolerate it. Her nutritional labs also indicated that she is not progressing.     We discussed that Dr. Winston will start the procedure w an exploratory laparoscopy and if needed, laparotomy. If there is no sign of metastasis, then he will proceed w the surgery. We had a cipriano discussion about the probability of her cancer advancing as we have not been able to make any real progress in her functional and nutritional status. I advised her that this was a worrisome finding. She understands as does Mr. Rose and they expressed understanding.       14. 3 kg, 16.6 kg (reduction since last visit)     TUG 6.51 secs (improved)     Review of Systems:  Review of Systems   Constitutional: Positive for activity change, appetite change, fatigue and unexpected weight change. Negative for fever. She has improved since last visit but not significantly.   HENT: Negative.   Respiratory: Negative.   Cardiovascular: Negative.   Gastrointestinal: Positive for nausea. Negative for blood in stool, constipation, diarrhea. Has diarrhea if eating anything w protein or fat. Taking creon and it is helping her eat and not have digestive problems  Endocrine: Negative.   Genitourinary: Negative.   Musculoskeletal: Negative.   Neurological: Negative.   Hematological: Negative.       Physical Exam:  Physical Exam   Constitutional: She is oriented  to person, place, and time. She is cooperative. She appears ill. No distress.   Well groomed, overall pallor and fatigued appearing, not in a wheelchair but has not made progress, still ill appearing.   HENT:   Head: Normocephalic and atraumatic.   Mouth/Throat: Uvula is midline, oropharynx is clear and moist and mucous membranes are normal.   Eyes: Conjunctivae and lids are normal. Lids are everted and swept, no foreign bodies found. No scleral icterus.   Neck: Trachea normal, normal range of motion, full passive range of motion without pain and phonation normal. Neck supple. No thyroid mass and no thyromegaly present.   Cardiovascular: Normal rate, regular rhythm, S1 normal, S2 normal and normal heart sounds. No edema  Pulses:  Radial pulses are 2+ on the right side, and 2+ on the left side.   Dorsalis pedis pulses are 2+ on the right side, and 2+ on the left side.   Pulmonary/Chest: Effort normal and breath sounds normal.   Abdominal: Soft. Bowel sounds are decreased. There is no hepatosplenomegaly. There is tenderness in the epigastric area. No hernia    We explained the rationale, risks, benefits of the exploratory lap and possible whipple. She stated that she had all her questions and concerns answered.    ASSESSMENT: probable metastatic disease, cachexia    PLAN:    1. Proceed w expl lap as discussed

## 2017-03-13 ENCOUNTER — ANESTHESIA (OUTPATIENT)
Dept: SURGERY | Facility: HOSPITAL | Age: 57
DRG: 421 | End: 2017-03-13
Payer: COMMERCIAL

## 2017-03-13 ENCOUNTER — ANESTHESIA EVENT (OUTPATIENT)
Dept: SURGERY | Facility: HOSPITAL | Age: 57
DRG: 421 | End: 2017-03-13
Payer: COMMERCIAL

## 2017-03-13 ENCOUNTER — HOSPITAL ENCOUNTER (INPATIENT)
Facility: HOSPITAL | Age: 57
LOS: 1 days | Discharge: HOME | DRG: 421 | End: 2017-03-14
Attending: SURGERY | Admitting: SURGERY
Payer: COMMERCIAL

## 2017-03-13 DIAGNOSIS — E11.9 DIABETES MELLITUS TYPE 2, DIET-CONTROLLED: ICD-10-CM

## 2017-03-13 DIAGNOSIS — E11.9 TYPE 2 DIABETES MELLITUS WITHOUT COMPLICATION, WITHOUT LONG-TERM CURRENT USE OF INSULIN: ICD-10-CM

## 2017-03-13 DIAGNOSIS — E78.00 HYPERCHOLESTEROLEMIA: ICD-10-CM

## 2017-03-13 DIAGNOSIS — I10 ESSENTIAL HYPERTENSION: ICD-10-CM

## 2017-03-13 DIAGNOSIS — K83.1 OBSTRUCTIVE JAUNDICE: Primary | ICD-10-CM

## 2017-03-13 DIAGNOSIS — E46 PROTEIN CALORIE MALNUTRITION: ICD-10-CM

## 2017-03-13 DIAGNOSIS — C25.0 MALIGNANT NEOPLASM OF HEAD OF PANCREAS: ICD-10-CM

## 2017-03-13 DIAGNOSIS — K86.89 PANCREATIC INSUFFICIENCY: ICD-10-CM

## 2017-03-13 DIAGNOSIS — C25.9 MALIGNANT NEOPLASM OF PANCREAS, UNSPECIFIED LOCATION OF MALIGNANCY: ICD-10-CM

## 2017-03-13 DIAGNOSIS — E46 MALNUTRITION: ICD-10-CM

## 2017-03-13 DIAGNOSIS — E55.9 VITAMIN D DEFICIENCY: ICD-10-CM

## 2017-03-13 LAB
ABO + RH BLD: NORMAL
BLD GP AB SCN CELLS X3 SERPL QL: NORMAL
POCT GLUCOSE: 103 MG/DL (ref 70–110)
POCT GLUCOSE: 187 MG/DL (ref 70–110)
POCT GLUCOSE: 204 MG/DL (ref 70–110)
POCT GLUCOSE: 98 MG/DL (ref 70–110)

## 2017-03-13 PROCEDURE — 37000008 HC ANESTHESIA 1ST 15 MINUTES: Performed by: SURGERY

## 2017-03-13 PROCEDURE — 94799 UNLISTED PULMONARY SVC/PX: CPT

## 2017-03-13 PROCEDURE — 71000039 HC RECOVERY, EACH ADD'L HOUR: Performed by: SURGERY

## 2017-03-13 PROCEDURE — 63600175 PHARM REV CODE 636 W HCPCS: Performed by: NURSE ANESTHETIST, CERTIFIED REGISTERED

## 2017-03-13 PROCEDURE — 88331 PATH CONSLTJ SURG 1 BLK 1SPC: CPT | Mod: 26,,, | Performed by: PATHOLOGY

## 2017-03-13 PROCEDURE — 88307 TISSUE EXAM BY PATHOLOGIST: CPT | Performed by: PATHOLOGY

## 2017-03-13 PROCEDURE — 63600175 PHARM REV CODE 636 W HCPCS: Performed by: SURGERY

## 2017-03-13 PROCEDURE — 47379 UNLISTED LAPS PX LIVER: CPT | Mod: ,,, | Performed by: SURGERY

## 2017-03-13 PROCEDURE — 36000708 HC OR TIME LEV III 1ST 15 MIN: Performed by: SURGERY

## 2017-03-13 PROCEDURE — 27000221 HC OXYGEN, UP TO 24 HOURS

## 2017-03-13 PROCEDURE — 25000003 PHARM REV CODE 250: Performed by: STUDENT IN AN ORGANIZED HEALTH CARE EDUCATION/TRAINING PROGRAM

## 2017-03-13 PROCEDURE — 71000033 HC RECOVERY, INTIAL HOUR: Performed by: SURGERY

## 2017-03-13 PROCEDURE — 25000003 PHARM REV CODE 250: Performed by: SURGERY

## 2017-03-13 PROCEDURE — 88307 TISSUE EXAM BY PATHOLOGIST: CPT | Mod: 26,,, | Performed by: PATHOLOGY

## 2017-03-13 PROCEDURE — 20600001 HC STEP DOWN PRIVATE ROOM

## 2017-03-13 PROCEDURE — 88305 TISSUE EXAM BY PATHOLOGIST: CPT | Mod: 26,,, | Performed by: PATHOLOGY

## 2017-03-13 PROCEDURE — 25000242 PHARM REV CODE 250 ALT 637 W/ HCPCS: Performed by: NURSE PRACTITIONER

## 2017-03-13 PROCEDURE — 27201423 OPTIME MED/SURG SUP & DEVICES STERILE SUPPLY: Performed by: SURGERY

## 2017-03-13 PROCEDURE — 86850 RBC ANTIBODY SCREEN: CPT

## 2017-03-13 PROCEDURE — 88112 CYTOPATH CELL ENHANCE TECH: CPT | Mod: 26,,, | Performed by: PATHOLOGY

## 2017-03-13 PROCEDURE — 94761 N-INVAS EAR/PLS OXIMETRY MLT: CPT

## 2017-03-13 PROCEDURE — 25000003 PHARM REV CODE 250: Performed by: NURSE ANESTHETIST, CERTIFIED REGISTERED

## 2017-03-13 PROCEDURE — 94640 AIRWAY INHALATION TREATMENT: CPT

## 2017-03-13 PROCEDURE — 88305 TISSUE EXAM BY PATHOLOGIST: CPT | Performed by: PATHOLOGY

## 2017-03-13 PROCEDURE — 37000009 HC ANESTHESIA EA ADD 15 MINS: Performed by: SURGERY

## 2017-03-13 PROCEDURE — 0W9G4ZZ DRAINAGE OF PERITONEAL CAVITY, PERCUTANEOUS ENDOSCOPIC APPROACH: ICD-10-PCS | Performed by: SURGERY

## 2017-03-13 PROCEDURE — D9220A PRA ANESTHESIA: Mod: ANES,,, | Performed by: ANESTHESIOLOGY

## 2017-03-13 PROCEDURE — 36000709 HC OR TIME LEV III EA ADD 15 MIN: Performed by: SURGERY

## 2017-03-13 PROCEDURE — 0FB14ZX EXCISION OF RIGHT LOBE LIVER, PERCUTANEOUS ENDOSCOPIC APPROACH, DIAGNOSTIC: ICD-10-PCS | Performed by: SURGERY

## 2017-03-13 PROCEDURE — D9220A PRA ANESTHESIA: Mod: CRNA,,, | Performed by: NURSE ANESTHETIST, CERTIFIED REGISTERED

## 2017-03-13 PROCEDURE — 86900 BLOOD TYPING SEROLOGIC ABO: CPT

## 2017-03-13 PROCEDURE — 49320 DIAG LAPARO SEPARATE PROC: CPT | Mod: ,,, | Performed by: SURGERY

## 2017-03-13 PROCEDURE — 27201037 HC PRESSURE MONITORING SET UP

## 2017-03-13 PROCEDURE — 86920 COMPATIBILITY TEST SPIN: CPT

## 2017-03-13 RX ORDER — NEOSTIGMINE METHYLSULFATE 1 MG/ML
INJECTION, SOLUTION INTRAVENOUS
Status: DISCONTINUED | OUTPATIENT
Start: 2017-03-13 | End: 2017-03-13

## 2017-03-13 RX ORDER — RAMELTEON 8 MG/1
8 TABLET ORAL NIGHTLY PRN
Status: DISCONTINUED | OUTPATIENT
Start: 2017-03-13 | End: 2017-03-14 | Stop reason: HOSPADM

## 2017-03-13 RX ORDER — PHENYLEPHRINE HYDROCHLORIDE 10 MG/ML
INJECTION INTRAVENOUS
Status: DISCONTINUED | OUTPATIENT
Start: 2017-03-13 | End: 2017-03-13

## 2017-03-13 RX ORDER — DIPHENHYDRAMINE HYDROCHLORIDE 50 MG/ML
25 INJECTION INTRAMUSCULAR; INTRAVENOUS EVERY 4 HOURS PRN
Status: DISCONTINUED | OUTPATIENT
Start: 2017-03-13 | End: 2017-03-14 | Stop reason: HOSPADM

## 2017-03-13 RX ORDER — CHOLECALCIFEROL (VITAMIN D3) 25 MCG
4000 TABLET ORAL EVERY MORNING
Status: DISCONTINUED | OUTPATIENT
Start: 2017-03-13 | End: 2017-03-14 | Stop reason: HOSPADM

## 2017-03-13 RX ORDER — PROPOFOL 10 MG/ML
VIAL (ML) INTRAVENOUS
Status: DISCONTINUED | OUTPATIENT
Start: 2017-03-13 | End: 2017-03-13

## 2017-03-13 RX ORDER — LIDOCAINE HCL/PF 100 MG/5ML
SYRINGE (ML) INTRAVENOUS
Status: DISCONTINUED | OUTPATIENT
Start: 2017-03-13 | End: 2017-03-13

## 2017-03-13 RX ORDER — ENOXAPARIN SODIUM 100 MG/ML
40 INJECTION SUBCUTANEOUS
Status: DISCONTINUED | OUTPATIENT
Start: 2017-03-14 | End: 2017-03-14 | Stop reason: HOSPADM

## 2017-03-13 RX ORDER — FENTANYL CITRATE 50 UG/ML
INJECTION, SOLUTION INTRAMUSCULAR; INTRAVENOUS
Status: DISCONTINUED | OUTPATIENT
Start: 2017-03-13 | End: 2017-03-13

## 2017-03-13 RX ORDER — IBUPROFEN 200 MG
24 TABLET ORAL
Status: DISCONTINUED | OUTPATIENT
Start: 2017-03-13 | End: 2017-03-14 | Stop reason: HOSPADM

## 2017-03-13 RX ORDER — IPRATROPIUM BROMIDE AND ALBUTEROL SULFATE 2.5; .5 MG/3ML; MG/3ML
3 SOLUTION RESPIRATORY (INHALATION)
Status: DISCONTINUED | OUTPATIENT
Start: 2017-03-13 | End: 2017-03-14 | Stop reason: HOSPADM

## 2017-03-13 RX ORDER — SODIUM CHLORIDE 0.9 % (FLUSH) 0.9 %
3 SYRINGE (ML) INJECTION EVERY 8 HOURS
Status: DISCONTINUED | OUTPATIENT
Start: 2017-03-13 | End: 2017-03-14 | Stop reason: HOSPADM

## 2017-03-13 RX ORDER — PANTOPRAZOLE SODIUM 40 MG/1
40 TABLET, DELAYED RELEASE ORAL DAILY
Status: DISCONTINUED | OUTPATIENT
Start: 2017-03-13 | End: 2017-03-14 | Stop reason: HOSPADM

## 2017-03-13 RX ORDER — SODIUM CHLORIDE 9 MG/ML
INJECTION, SOLUTION INTRAVENOUS CONTINUOUS
Status: DISCONTINUED | OUTPATIENT
Start: 2017-03-13 | End: 2017-03-14

## 2017-03-13 RX ORDER — SODIUM CHLORIDE 9 MG/ML
INJECTION, SOLUTION INTRAVENOUS CONTINUOUS
Status: DISCONTINUED | OUTPATIENT
Start: 2017-03-13 | End: 2017-03-13

## 2017-03-13 RX ORDER — GLYCOPYRROLATE 0.2 MG/ML
INJECTION INTRAMUSCULAR; INTRAVENOUS
Status: DISCONTINUED | OUTPATIENT
Start: 2017-03-13 | End: 2017-03-13

## 2017-03-13 RX ORDER — ROCURONIUM BROMIDE 10 MG/ML
INJECTION, SOLUTION INTRAVENOUS
Status: DISCONTINUED | OUTPATIENT
Start: 2017-03-13 | End: 2017-03-13

## 2017-03-13 RX ORDER — PROMETHAZINE HYDROCHLORIDE 25 MG/ML
INJECTION, SOLUTION INTRAMUSCULAR; INTRAVENOUS
Status: DISCONTINUED | OUTPATIENT
Start: 2017-03-13 | End: 2017-03-13

## 2017-03-13 RX ORDER — GLUCAGON 1 MG
1 KIT INJECTION
Status: DISCONTINUED | OUTPATIENT
Start: 2017-03-13 | End: 2017-03-14 | Stop reason: HOSPADM

## 2017-03-13 RX ORDER — MIDAZOLAM HYDROCHLORIDE 1 MG/ML
INJECTION, SOLUTION INTRAMUSCULAR; INTRAVENOUS
Status: DISCONTINUED | OUTPATIENT
Start: 2017-03-13 | End: 2017-03-13

## 2017-03-13 RX ORDER — SODIUM CHLORIDE 0.9 % (FLUSH) 0.9 %
3 SYRINGE (ML) INJECTION
Status: DISCONTINUED | OUTPATIENT
Start: 2017-03-13 | End: 2017-03-14 | Stop reason: HOSPADM

## 2017-03-13 RX ORDER — SODIUM CHLORIDE 0.9 % (FLUSH) 0.9 %
3 SYRINGE (ML) INJECTION EVERY 8 HOURS
Status: DISCONTINUED | OUTPATIENT
Start: 2017-03-13 | End: 2017-03-13

## 2017-03-13 RX ORDER — FENTANYL 50 UG/1
1 PATCH TRANSDERMAL
Status: DISCONTINUED | OUTPATIENT
Start: 2017-03-13 | End: 2017-03-14 | Stop reason: HOSPADM

## 2017-03-13 RX ORDER — ENOXAPARIN SODIUM 100 MG/ML
40 INJECTION SUBCUTANEOUS EVERY 24 HOURS
Status: DISCONTINUED | OUTPATIENT
Start: 2017-03-13 | End: 2017-03-13

## 2017-03-13 RX ORDER — INSULIN ASPART 100 [IU]/ML
1-10 INJECTION, SOLUTION INTRAVENOUS; SUBCUTANEOUS
Status: DISCONTINUED | OUTPATIENT
Start: 2017-03-13 | End: 2017-03-14 | Stop reason: HOSPADM

## 2017-03-13 RX ORDER — IBUPROFEN 200 MG
16 TABLET ORAL
Status: DISCONTINUED | OUTPATIENT
Start: 2017-03-13 | End: 2017-03-14 | Stop reason: HOSPADM

## 2017-03-13 RX ORDER — OXYCODONE AND ACETAMINOPHEN 5; 325 MG/1; MG/1
1 TABLET ORAL EVERY 4 HOURS PRN
Status: DISCONTINUED | OUTPATIENT
Start: 2017-03-13 | End: 2017-03-14

## 2017-03-13 RX ORDER — HYDROMORPHONE HYDROCHLORIDE 1 MG/ML
0.5 INJECTION, SOLUTION INTRAMUSCULAR; INTRAVENOUS; SUBCUTANEOUS EVERY 4 HOURS PRN
Status: DISCONTINUED | OUTPATIENT
Start: 2017-03-13 | End: 2017-03-14

## 2017-03-13 RX ORDER — ACETAMINOPHEN 325 MG/1
650 TABLET ORAL EVERY 8 HOURS PRN
Status: DISCONTINUED | OUTPATIENT
Start: 2017-03-13 | End: 2017-03-14 | Stop reason: HOSPADM

## 2017-03-13 RX ADMIN — PHENYLEPHRINE HYDROCHLORIDE 300 MCG: 10 INJECTION INTRAVENOUS at 07:03

## 2017-03-13 RX ADMIN — SODIUM CHLORIDE, SODIUM GLUCONATE, SODIUM ACETATE, POTASSIUM CHLORIDE, MAGNESIUM CHLORIDE, SODIUM PHOSPHATE, DIBASIC, AND POTASSIUM PHOSPHATE: .53; .5; .37; .037; .03; .012; .00082 INJECTION, SOLUTION INTRAVENOUS at 08:03

## 2017-03-13 RX ADMIN — GLYCOPYRROLATE 0.6 MG: 0.2 INJECTION, SOLUTION INTRAMUSCULAR; INTRAVENOUS at 08:03

## 2017-03-13 RX ADMIN — PHENYLEPHRINE HYDROCHLORIDE 200 MCG: 10 INJECTION INTRAVENOUS at 07:03

## 2017-03-13 RX ADMIN — NEOSTIGMINE METHYLSULFATE 5 MG: 1 INJECTION INTRAVENOUS at 08:03

## 2017-03-13 RX ADMIN — FENTANYL 1 PATCH: 50 PATCH, EXTENDED RELEASE TRANSDERMAL at 02:03

## 2017-03-13 RX ADMIN — PROMETHAZINE HYDROCHLORIDE 12.5 MG: 25 INJECTION INTRAMUSCULAR; INTRAVENOUS at 08:03

## 2017-03-13 RX ADMIN — OXYCODONE HYDROCHLORIDE AND ACETAMINOPHEN 1 TABLET: 5; 325 TABLET ORAL at 10:03

## 2017-03-13 RX ADMIN — IPRATROPIUM BROMIDE AND ALBUTEROL SULFATE 3 ML: .5; 3 SOLUTION RESPIRATORY (INHALATION) at 01:03

## 2017-03-13 RX ADMIN — Medication 3 ML: at 02:03

## 2017-03-13 RX ADMIN — SODIUM CHLORIDE: 0.9 INJECTION, SOLUTION INTRAVENOUS at 09:03

## 2017-03-13 RX ADMIN — ROCURONIUM BROMIDE 30 MG: 10 INJECTION, SOLUTION INTRAVENOUS at 07:03

## 2017-03-13 RX ADMIN — Medication 3 ML: at 10:03

## 2017-03-13 RX ADMIN — SODIUM CHLORIDE: 0.9 INJECTION, SOLUTION INTRAVENOUS at 06:03

## 2017-03-13 RX ADMIN — PANTOPRAZOLE SODIUM 40 MG: 40 TABLET, DELAYED RELEASE ORAL at 10:03

## 2017-03-13 RX ADMIN — PIPERACILLIN AND TAZOBACTAM 4.5 G: 4; .5 INJECTION, POWDER, LYOPHILIZED, FOR SOLUTION INTRAVENOUS; PARENTERAL at 07:03

## 2017-03-13 RX ADMIN — HYDROMORPHONE HYDROCHLORIDE 0.5 MG: 1 INJECTION, SOLUTION INTRAMUSCULAR; INTRAVENOUS; SUBCUTANEOUS at 10:03

## 2017-03-13 RX ADMIN — ENOXAPARIN SODIUM 40 MG: 100 INJECTION SUBCUTANEOUS at 06:03

## 2017-03-13 RX ADMIN — FENTANYL CITRATE 100 MCG: 50 INJECTION, SOLUTION INTRAMUSCULAR; INTRAVENOUS at 07:03

## 2017-03-13 RX ADMIN — PROPOFOL 120 MG: 10 INJECTION, EMULSION INTRAVENOUS at 07:03

## 2017-03-13 RX ADMIN — FENTANYL CITRATE 50 MCG: 50 INJECTION, SOLUTION INTRAMUSCULAR; INTRAVENOUS at 08:03

## 2017-03-13 RX ADMIN — LIDOCAINE HYDROCHLORIDE 80 MG: 20 INJECTION, SOLUTION INTRAVENOUS at 07:03

## 2017-03-13 RX ADMIN — MIDAZOLAM HYDROCHLORIDE 2 MG: 1 INJECTION, SOLUTION INTRAMUSCULAR; INTRAVENOUS at 07:03

## 2017-03-13 NOTE — INTERVAL H&P NOTE
No interval change    I thoroughly reviewed the rationale/indications, procedural details, expected recovery, and possible complications for the planned procedure.  The patient/family asked a number of important questions that I answered to the best of my ability and they would like to move forward with the procedure.

## 2017-03-13 NOTE — PROGRESS NOTES
Informed Jaylin RT that pt needs IS teaching.Informed Dr. Arechiga pts cbg was 204, no new orders noted.

## 2017-03-13 NOTE — ANESTHESIA POSTPROCEDURE EVALUATION
"Anesthesia Post Evaluation    Patient: Citlali Rose    Procedure(s) Performed: Procedure(s) (LRB):  LAPAROSCOPY-DIAGNOSTIC (N/A)    Final Anesthesia Type: general  Patient location during evaluation: PACU  Patient participation: Yes- Able to Participate  Level of consciousness: awake and alert  Post-procedure vital signs: reviewed and stable  Pain management: adequate  Airway patency: patent  PONV status at discharge: No PONV  Anesthetic complications: no      Cardiovascular status: blood pressure returned to baseline  Respiratory status: unassisted  Hydration status: euvolemic  Follow-up not needed.        Visit Vitals    BP (!) 102/55    Pulse 81    Temp 36.5 °C (97.7 °F) (Oral)    Resp 18    Ht 4' 11" (1.499 m)    Wt 52.6 kg (116 lb)    SpO2 (!) 93%    Breastfeeding No    BMI 23.43 kg/m2       Pain/Huey Score: Pain Assessment Performed: Yes (3/13/2017 10:00 AM)  Presence of Pain: complains of pain/discomfort (3/13/2017 10:00 AM)  Pain Rating Prior to Med Admin: 5 (3/13/2017 10:04 AM)  Huey Score: 9 (3/13/2017 10:00 AM)      "

## 2017-03-13 NOTE — ANESTHESIA RELEASE NOTE
"Anesthesia Release from PACU Note    Patient: Citlali Rose    Procedure(s) Performed: Procedure(s) (LRB):  LAPAROSCOPY-DIAGNOSTIC (N/A)    Anesthesia type: general    Post pain: Adequate analgesia    Post assessment: no apparent anesthetic complications, tolerated procedure well and no evidence of recall    Last Vitals:   Visit Vitals    BP (!) 102/55    Pulse 81    Temp 36.5 °C (97.7 °F) (Oral)    Resp 18    Ht 4' 11" (1.499 m)    Wt 52.6 kg (116 lb)    SpO2 (!) 93%    Breastfeeding No    BMI 23.43 kg/m2       Post vital signs: stable    Level of consciousness: awake and alert     Nausea/Vomiting: no nausea/no vomiting    Complications: none    Airway Patency: patent    Respiratory: unassisted    Cardiovascular: stable and blood pressure at baseline    Hydration: euvolemic  "

## 2017-03-13 NOTE — PLAN OF CARE
Problem: Patient Care Overview  Goal: Plan of Care Review  Outcome: Ongoing (interventions implemented as appropriate)  Patient AAOx4. Patient VSS. Patient transferred from PACU. Patient calm and cooperative. Family remains at bedside. Patient remains free of falls.

## 2017-03-13 NOTE — TRANSFER OF CARE
"Anesthesia Transfer of Care Note    Patient: iCtlali Rose    Procedure(s) Performed: Procedure(s) (LRB):  LAPAROSCOPY-DIAGNOSTIC (N/A)    Patient location: PACU    Anesthesia Type: general    Transport from OR: Transported from OR on 6-10 L/min O2 by face mask with adequate spontaneous ventilation    Post pain: adequate analgesia    Post assessment: no apparent anesthetic complications    Post vital signs: stable    Level of consciousness: sedated    Nausea/Vomiting: no nausea/vomiting    Complications: none          Last vitals:   Visit Vitals    /61    Pulse 98    Temp 36.8 °C (98.3 °F) (Oral)    Resp 16    Ht 4' 11" (1.499 m)    Wt 52.6 kg (116 lb)    SpO2 100%    Breastfeeding No    BMI 23.43 kg/m2     "

## 2017-03-13 NOTE — PLAN OF CARE
Problem: Patient Care Overview  Goal: Plan of Care Review  Outcome: Ongoing (interventions implemented as appropriate)  Vital signs stable. Afebrile. Drowsy but oriented. Denies nausea. Pain controlled via PRN medications & fentanyl patches. Surgical incisions remain CDI.  Multiple family members at bedside and updated throughout regarding POC

## 2017-03-13 NOTE — OP NOTE
DATE OF PROCEDURE:  03/13/2017.    POSTOPERATIVE DIAGNOSIS:  Pancreatic cancer.    POSTOPERATIVE DIAGNOSIS:  Pancreatic cancer metastatic to the liver.    PROCEDURES PERFORMED:  1.  Diagnostic laparoscopy with evacuation of ascites.  2.  Wedge biopsy of segment 5 of the liver.    SURGEON:  Benito Winston M.D.    ASSISTANT:  Dru Brady M.D. (RES).    ANESTHESIA:  General endotracheal.    FINDINGS:  Frozen section on segment 5.  Biopsy was positive for adenocarcinoma.    ESTIMATED BLOOD LOSS:  Minimal.    INDICATIONS FOR PROCEDURE:  Ms. Rose is a 56-year-old female who underwent   neoadjuvant therapy for pancreatic cancer, who we have not been unable to   improve her nutrition, so we were worried that perhaps she had metastatic   disease that we could not see.  She also had some ascites, which was worrisome   and a rising tumor marker, so I did offer her diagnostic laparoscopy, informed   her of possible risks and complications, written consent was obtained.    PROCEDURE IN DETAIL:  Ms. Rose was taken to the Operating Room, placed in   supine position, placed under general anesthesia.  Abdomen was prepped and   draped in usual sterile fashion.  We then made a small incision by the   umbilicus, lifted the umbilical stalk, passed the Veress needle, created a   pneumoperitoneum and inserted the Optiview trocar.  We did not see any sign of   injury from creation of pneumoperitoneum.  We placed two more 5 mm ports in the   right mid and lower abdomen.  We then evacuated a large amount of ascites and   sent it off for cytology.  I then thoroughly inspected all of the peritoneal   space including the lesser sac.  Mainly I could see what looked abnormal was a   very subtle finding on segment 7, but I felt we should go ahead and biopsy it.    I did this with a scissor and then controlled bleeding with electrocautery and   unfortunately, the biopsy returned as positive for adenocarcinoma.  Once I had   good  hemostasis.  I removed my ports, made sure there was not any bleeding from   the port sites, closed the fascia at the umbilicus after we released   pneumoperitoneum with a heavy Vicryl and then went ahead and closed the skin   with Monocryl.      THIEN  dd: 03/13/2017 09:35:11 (CDT)  td: 03/13/2017 11:58:06 (CDT)  Doc ID   #[id number]  Job ID #[id number]    CC:

## 2017-03-13 NOTE — ANESTHESIA PREPROCEDURE EVALUATION
03/13/2017  Citlali Rose is a 56 y.o., female.    [facility] Anesthesia Evaluation    I have reviewed the Patient Summary Reports.     I have reviewed the Medications.     Review of Systems  Anesthesia Hx:  No problems with previous Anesthesia  History of prior surgery of interest to airway management or planning: Previous anesthesia: General   Social:  Former Smoker, Social Alcohol Use    Hematology/Oncology:  Hematology Normal   Oncology Normal     EENT/Dental:EENT/Dental Normal   Cardiovascular:   Exercise tolerance: poor Hypertension, well controlled    Pulmonary:  Pulmonary Normal    Renal/:  Renal/ Normal     Hepatic/GI:  Hepatic/GI Normal    Musculoskeletal:  Musculoskeletal Normal    Neurological:   Headaches    Endocrine:   Diabetes, well controlled, type 2    Dermatological:  Skin Normal    Psych:  Psychiatric Normal           Physical Exam   Airway/Jaw/Neck:  Airway Findings: Mouth Opening: Normal Tongue: Normal  General Airway Assessment: Adult  Mallampati: II  TM Distance: Normal, at least 6 cm  Jaw/Neck Findings:  Neck ROM: Normal ROM      Dental:  Dental Findings: In tact        Mental Status:  Mental Status Findings:  Cooperative, Alert and Oriented         Anesthesia Plan  Type of Anesthesia, risks & benefits discussed:  Anesthesia Type:  general  Patient's Preference: GA  Intra-op Monitoring Plan:   Intra-op Monitoring Plan Comments:   Post Op Pain Control Plan:   Post Op Pain Control Plan Comments:   Induction:   IV  Beta Blocker:  Patient is not currently on a Beta-Blocker (No further documentation required).       Informed Consent: Patient understands risks and agrees with Anesthesia plan.  Questions answered. Anesthesia consent signed with patient.  ASA Score: 3     Day of Surgery Review of History & Physical:  There are no significant changes.  H&P update referred to the surgeon.          Ready For Surgery From Anesthesia Perspective.

## 2017-03-13 NOTE — BRIEF OP NOTE
[facility]  Surgery Department  Operative Note    SUMMARY     Date of Procedure: 3/13/2017     Procedure: Procedure(s) (LRB):  LAPAROSCOPY-DIAGNOSTIC (N/A)     Surgeon(s) and Role:     * Dru Brady MD - Resident - Assisting     * EMMANUELLE Winston MD - Primary        Pre-Operative Diagnosis: Malignant neoplasm of pancreas, unspecified location of malignancy [C25.9]    Post-Operative Diagnosis: Post-Op Diagnosis Codes:     * Malignant neoplasm of pancreas, unspecified location of malignancy [C25.9]    Anesthesia: General    Technical Procedures Used: laparoscopy    Description of the Findings of the Procedure: diagnostic laparoscopy area in the liver was biopsy consistent with adenocarcinoma    Significant Surgical Tasks Conducted by the Assistant(s), if Applicable: assisting the primary surgeon    Complications: No    Estimated Blood Loss (EBL): * No values recorded between 3/13/2017  7:58 AM and 3/13/2017  8:46 AM *           Implants: * No implants in log *    Specimens:   Specimen (12h ago through future)    Start     Ordered    03/13/17 0843  Specimen to Pathology - Surgery  Once     Comments:  1) segment 5 of the liver-Frozen-Pathology.    03/13/17 0844                  Condition: Good    Disposition: PACU - hemodynamically stable.    Attestation: I was present and scrubbed for the entire procedure.

## 2017-03-13 NOTE — IP AVS SNAPSHOT
[facility]  [address]  [facility] [address]  Phone: [phone]           Patient Discharge Instructions     Our goal is to set you up for success. This packet includes information on your condition, medications, and your home care. It will help you to care for yourself so you don't get sicker and need to go back to the hospital.     Please ask your nurse if you have any questions.        There are many details to remember when preparing to leave the hospital. Here is what you will need to do:    1. Take your medicine. If you are prescribed medications, review your Medication List in the following pages. You may have new medications to  at the pharmacy and others that you'll need to stop taking. Review the instructions for how and when to take your medications. Talk with your doctor or nurses if you are unsure of what to do.     2. Go to your follow-up appointments. Specific follow-up information is listed in the following pages. Your may be contacted by a transition nurse or clinical provider about future appointments. Be sure we have all of the phone numbers to reach you, if needed. Please contact your provider's office if you are unable to make an appointment.     3. Watch for warning signs. Your doctor or nurse will give you detailed warning signs to watch for and when to call for assistance. These instructions may also include educational information about your condition. If you experience any of warning signs to your health, call your doctor.               [facility]  Unless otherwise directed by your provider, please contact [facility], our nurse care line that is available for 24/7 assistance.     [phone] (toll-free)    Registered nurses in the [facility] provide clinical advisement, health education, appointment booking, and other advisory services.                    ** Verify the list of medication(s) below is accurate and up  to date. Carry this with you in case of emergency. If your medications have changed, please notify your healthcare provider.             Medication List      START taking these medications        Additional Info                      fentaNYL 50 mcg/hr   Commonly known as:  DURAGESIC   Quantity:  10 patch   Refills:  0   Dose:  1 patch    Last time this was given:  1 patch on 3/13/2017  2:18 PM   Instructions:  Place 1 patch onto the skin every 72 hours.     Begin Date    AM    Noon    PM    Bedtime         CHANGE how you take these medications        Additional Info                      lipase-protease-amylase 24,000-76,000-120,000 units 24,000-76,000 -120,000 unit capsule   Commonly known as:  CREON   Quantity:  270 capsule   Refills:  11   Dose:  2 capsule   What changed:    - how much to take  - additional instructions    Instructions:  Take 2 capsules by mouth 3 (three) times daily with meals. Take 1 capsule w snacks     Begin Date    AM    Noon    PM    Bedtime       * oxycodone-acetaminophen 5-325 mg per tablet   Commonly known as:  PERCOCET   Quantity:  40 tablet   Refills:  0   Dose:  1 tablet   What changed:  Another medication with the same name was added. Make sure you understand how and when to take each.    Last time this was given:  2 tablets on 3/14/2017  8:23 AM   Instructions:  Take 1 tablet by mouth every 6 (six) hours as needed for Pain.     Begin Date    AM    Noon    PM    Bedtime       * oxycodone-acetaminophen 5-325 mg per tablet   Commonly known as:  PERCOCET   Quantity:  50 tablet   Refills:  0   Dose:  2 tablet   What changed:  You were already taking a medication with the same name, and this prescription was added. Make sure you understand how and when to take each.    Last time this was given:  2 tablets on 3/14/2017  8:23 AM   Instructions:  Take 2 tablets by mouth every 4 (four) hours as needed.     Begin Date    AM    Noon    PM    Bedtime       pantoprazole 40 MG tablet   Commonly  known as:  PROTONIX   Quantity:  30 tablet   Refills:  11   Dose:  40 mg   What changed:    - when to take this  - reasons to take this    Last time this was given:  40 mg on 3/14/2017  8:23 AM   Instructions:  Take 1 tablet (40 mg total) by mouth once daily.     Begin Date    AM    Noon    PM    Bedtime       * Notice:  This list has 2 medication(s) that are the same as other medications prescribed for you. Read the directions carefully, and ask your doctor or other care provider to review them with you.      CONTINUE taking these medications        Additional Info                      insulin lispro 100 unit/mL injection   Commonly known as:  HUMALOG   Refills:  0    Instructions:  Inject into the skin 3 (three) times daily before meals. Uses a Sliding Scale which starts at 200, Uses ~ once a month     Begin Date    AM    Noon    PM    Bedtime       multivitamin capsule   Refills:  0   Dose:  1 capsule    Instructions:  Take 1 capsule by mouth every morning. Takes a chewable multivitamin every morning     Begin Date    AM    Noon    PM    Bedtime       promethazine 12.5 MG Tab   Commonly known as:  PHENERGAN   Quantity:  60 tablet   Refills:  1   Dose:  25 mg    Instructions:  Take 2 tablets (25 mg total) by mouth every 6 (six) hours as needed.     Begin Date    AM    Noon    PM    Bedtime       VITAMIN D3 2,000 unit Tab   Refills:  0   Dose:  2 tablet   Generic drug:  cholecalciferol (vitamin D3)    Last time this was given:  4,000 Units on 3/14/2017  6:07 AM   Instructions:  Take 2 tablets by mouth every morning. Takes two 2000 mg tablets (4000 units) every morning     Begin Date    AM    Noon    PM    Bedtime            Where to Get Your Medications      You can get these medications from any pharmacy     Bring a paper prescription for each of these medications     fentaNYL 50 mcg/hr    oxycodone-acetaminophen 5-325 mg per tablet                  Please bring to all follow up appointments:    1. A copy of your  discharge instructions.  2. All medicines you are currently taking in their original bottles.  3. Identification and insurance card.    Please arrive 15 minutes ahead of scheduled appointment time.    Please call 24 hours in advance if you must reschedule your appointment and/or time.        Your Scheduled Appointments     Mar 21, 2017  3:00 PM CDT   Post OP with EMMANUELLE Winston MD   [place] [facility] Gen [facility]/S[facility] On[facility] ([facility])    [address]  [facility] [id number] [phone]              Follow-up Information     Follow up with DREW Winston MD In 2 weeks.    Specialty:  General Surgery    Why:  Office to call you w/appt.     Contact information:    [address]  [facility] [address]  [phone]          Discharge Instructions     Future Orders    Incentive spirometry     Activity as tolerated     Call MD for:  difficulty breathing or increased cough     Call MD for:  increased confusion or weakness     Call MD for:  persistent dizziness, light-headedness, or visual disturbances     Call MD for:  persistent nausea and vomiting or diarrhea     Call MD for:  persistent nausea and vomiting or diarrhea     Call MD for:  redness, tenderness, or signs of infection (pain, swelling, redness, odor or green/yellow discharge around incision site)     Call MD for:  redness, tenderness, or signs of infection (pain, swelling, redness, odor or green/yellow discharge around incision site)     Call MD for:  severe persistent headache     Call MD for:  severe uncontrolled pain     Call MD for:  severe uncontrolled pain     Call MD for:  temperature >100.4     Call MD for:  temperature >100.4     Call MD for:  worsening rash     Diet general     Questions:    Total calories:      Fat restriction, if any:      Protein restriction, if any:      Na restriction, if any:      Fluid restriction:      Additional restrictions:      Diet general     Questions:    Total calories:      Fat restriction, if any:  "     Protein restriction, if any:      Na restriction, if any:      Fluid restriction:      Additional restrictions:      Lifting restrictions     Lifting restrictions     Comments:    Do not lift more than 10lbs for 6 weeks    No dressing needed     Comments:    Ok to shower, wash wound with soap and water, keep incision clean and dry at all times, no swimming, no tub bathing.    No dressing needed         Primary Diagnosis     Your primary diagnosis was:  Cancer Of Pancreas      Admission Information     Date & Time Provider Department [facility]    3/13/2017  5:33 AM EMMANUELLE Winston MD [facility] [id number]      Care Providers     Provider Role Specialty Primary office phone    EMMANUELLE Winston MD Attending Provider General Surgery [phone]    EMMANUELLE Winston MD Surgeon  General Surgery [phone]      Your Vitals Were     BP Pulse Temp Resp Height Weight    117/57 (BP Location: Right arm, Patient Position: Lying, BP Method: Automatic) 99 98.1 °F (36.7 °C) (Oral) 16 4' 11" (1.499 m) 52.6 kg (116 lb)    SpO2 BMI             98% 23.43 kg/m2         Recent Lab Values        2/3/2017                          10:45 AM           A1C 6.6 (H)           Comment for A1C at 10:45 AM on 2/3/2017:  According to ADA guidelines, hemoglobin A1C <7.0% represents  optimal control in non-pregnant diabetic patients.  Different  metrics may apply to specific populations.   Standards of Medical Care in Diabetes - 2016.  For the purpose of screening for the presence of diabetes:  <5.7%     Consistent with the absence of diabetes  5.7-6.4%  Consistent with increasing risk for diabetes   (prediabetes)  >or=6.5%  Consistent with diabetes  Currently no consensus exists for use of hemoglobin A1C  for diagnosis of diabetes for children.        Pending Labs     Order Current Status    Cytology Specimen-Medical Cytology (Fluid/Wash/Brush) In process    Specimen to Pathology - Surgery In process    Prepare RBC 2 Units; " surgery Preliminary result      Allergies as of 3/14/2017        Reactions    Zofran [Ondansetron Hcl (Pf)] Nausea And Vomiting    Made the nausea worse      Advance Directives     An advance directive is a document which, in the event you are no longer able to make decisions for yourself, tells your healthcare team what kind of treatment you do or do not want to receive, or who you would like to make those decisions for you.  If you do not currently have an advance directive, [facility] encourages you to create one.  For more information call:  [phone]), [phone] ([phone]),  or log on to [email or web address].        Language Assistance Services     ATTENTION: Language assistance services are available, free of charge. Please call [phone].      ATENCIÓN: Si habla español, tiene a najera disposición servicios gratuitos de asistencia lingüística. Llame al [phone].     [facility] Ý: N?u b?n nói Ti?ng Vi?t, có các d?ch v? h? tr? ngôn ng? mi?n phí dành cho b?n. G?i s? [phone].        Diabetes Discharge Instructions                                    [facility] complies with applicable Federal civil rights laws and does not discriminate on the basis of race, color, national origin, age, disability, or sex.

## 2017-03-13 NOTE — NURSING TRANSFER
Nursing Transfer Note      3/13/2017     Transfer to 1063    Transfer via stretcher    Transfer with IV pump    Transported by pct    Medicines sent: IV fluids    Chart send with patient: yes    Notified: multiple family members at bedside    Patient reassessed at: 3/13/17 @ 0617

## 2017-03-14 VITALS
DIASTOLIC BLOOD PRESSURE: 57 MMHG | BODY MASS INDEX: 23.39 KG/M2 | OXYGEN SATURATION: 98 % | SYSTOLIC BLOOD PRESSURE: 117 MMHG | HEIGHT: 59 IN | RESPIRATION RATE: 16 BRPM | WEIGHT: 116 LBS | TEMPERATURE: 98 F | HEART RATE: 99 BPM

## 2017-03-14 LAB
ANION GAP SERPL CALC-SCNC: 7 MMOL/L
BASOPHILS # BLD AUTO: 0 K/UL
BASOPHILS # BLD AUTO: 0.01 K/UL
BASOPHILS NFR BLD: 0 %
BASOPHILS NFR BLD: 0.2 %
BUN SERPL-MCNC: 3 MG/DL
CALCIUM SERPL-MCNC: 7.6 MG/DL
CHLORIDE SERPL-SCNC: 107 MMOL/L
CO2 SERPL-SCNC: 26 MMOL/L
CREAT SERPL-MCNC: 0.5 MG/DL
DIFFERENTIAL METHOD: ABNORMAL
DIFFERENTIAL METHOD: ABNORMAL
EOSINOPHIL # BLD AUTO: 0.1 K/UL
EOSINOPHIL # BLD AUTO: 0.1 K/UL
EOSINOPHIL NFR BLD: 1.5 %
EOSINOPHIL NFR BLD: 1.9 %
ERYTHROCYTE [DISTWIDTH] IN BLOOD BY AUTOMATED COUNT: 14.3 %
ERYTHROCYTE [DISTWIDTH] IN BLOOD BY AUTOMATED COUNT: 14.5 %
EST. GFR  (AFRICAN AMERICAN): >60 ML/MIN/1.73 M^2
EST. GFR  (NON AFRICAN AMERICAN): >60 ML/MIN/1.73 M^2
GLUCOSE SERPL-MCNC: 92 MG/DL
HCT VFR BLD AUTO: 22.3 %
HCT VFR BLD AUTO: 28.1 %
HGB BLD-MCNC: 7.4 G/DL
HGB BLD-MCNC: 9.3 G/DL
LYMPHOCYTES # BLD AUTO: 0.4 K/UL
LYMPHOCYTES # BLD AUTO: 0.5 K/UL
LYMPHOCYTES NFR BLD: 11.9 %
LYMPHOCYTES NFR BLD: 16 %
MAGNESIUM SERPL-MCNC: 1.2 MG/DL
MCH RBC QN AUTO: 34.8 PG
MCH RBC QN AUTO: 35.6 PG
MCHC RBC AUTO-ENTMCNC: 33.1 %
MCHC RBC AUTO-ENTMCNC: 33.2 %
MCV RBC AUTO: 105 FL
MCV RBC AUTO: 107 FL
MONOCYTES # BLD AUTO: 0.2 K/UL
MONOCYTES # BLD AUTO: 0.4 K/UL
MONOCYTES NFR BLD: 8.6 %
MONOCYTES NFR BLD: 9.7 %
NEUTROPHILS # BLD AUTO: 2 K/UL
NEUTROPHILS # BLD AUTO: 3.1 K/UL
NEUTROPHILS NFR BLD: 73.5 %
NEUTROPHILS NFR BLD: 76.5 %
PHOSPHATE SERPL-MCNC: 3.3 MG/DL
PLATELET # BLD AUTO: 141 K/UL
PLATELET # BLD AUTO: 93 K/UL
PMV BLD AUTO: 10.3 FL
PMV BLD AUTO: 9.8 FL
POCT GLUCOSE: 108 MG/DL (ref 70–110)
POTASSIUM SERPL-SCNC: 3.1 MMOL/L
RBC # BLD AUTO: 2.08 M/UL
RBC # BLD AUTO: 2.67 M/UL
SODIUM SERPL-SCNC: 140 MMOL/L
WBC # BLD AUTO: 2.69 K/UL
WBC # BLD AUTO: 4.11 K/UL

## 2017-03-14 PROCEDURE — 85025 COMPLETE CBC W/AUTO DIFF WBC: CPT

## 2017-03-14 PROCEDURE — 97165 OT EVAL LOW COMPLEX 30 MIN: CPT

## 2017-03-14 PROCEDURE — 63600175 PHARM REV CODE 636 W HCPCS: Performed by: STUDENT IN AN ORGANIZED HEALTH CARE EDUCATION/TRAINING PROGRAM

## 2017-03-14 PROCEDURE — 25000003 PHARM REV CODE 250: Performed by: STUDENT IN AN ORGANIZED HEALTH CARE EDUCATION/TRAINING PROGRAM

## 2017-03-14 PROCEDURE — 83735 ASSAY OF MAGNESIUM: CPT

## 2017-03-14 PROCEDURE — 25000003 PHARM REV CODE 250: Performed by: SURGERY

## 2017-03-14 PROCEDURE — 63600175 PHARM REV CODE 636 W HCPCS: Performed by: SURGERY

## 2017-03-14 PROCEDURE — 25000242 PHARM REV CODE 250 ALT 637 W/ HCPCS: Performed by: NURSE PRACTITIONER

## 2017-03-14 PROCEDURE — 97161 PT EVAL LOW COMPLEX 20 MIN: CPT

## 2017-03-14 PROCEDURE — 94640 AIRWAY INHALATION TREATMENT: CPT

## 2017-03-14 PROCEDURE — 80048 BASIC METABOLIC PNL TOTAL CA: CPT

## 2017-03-14 PROCEDURE — 36415 COLL VENOUS BLD VENIPUNCTURE: CPT

## 2017-03-14 PROCEDURE — 94761 N-INVAS EAR/PLS OXIMETRY MLT: CPT

## 2017-03-14 PROCEDURE — 84100 ASSAY OF PHOSPHORUS: CPT

## 2017-03-14 RX ORDER — POTASSIUM CHLORIDE 20 MEQ/1
60 TABLET, EXTENDED RELEASE ORAL ONCE
Status: COMPLETED | OUTPATIENT
Start: 2017-03-14 | End: 2017-03-14

## 2017-03-14 RX ORDER — OXYCODONE AND ACETAMINOPHEN 5; 325 MG/1; MG/1
2 TABLET ORAL EVERY 4 HOURS PRN
Status: DISCONTINUED | OUTPATIENT
Start: 2017-03-14 | End: 2017-03-14 | Stop reason: HOSPADM

## 2017-03-14 RX ORDER — OXYCODONE AND ACETAMINOPHEN 5; 325 MG/1; MG/1
2 TABLET ORAL EVERY 4 HOURS PRN
Qty: 50 TABLET | Refills: 0 | Status: SHIPPED | OUTPATIENT
Start: 2017-03-14 | End: 2017-04-10 | Stop reason: SDUPTHER

## 2017-03-14 RX ORDER — FENTANYL 50 UG/1
1 PATCH TRANSDERMAL
Qty: 10 PATCH | Refills: 0 | Status: SHIPPED | OUTPATIENT
Start: 2017-03-14 | End: 2017-03-21 | Stop reason: SDUPTHER

## 2017-03-14 RX ADMIN — OXYCODONE HYDROCHLORIDE AND ACETAMINOPHEN 2 TABLET: 5; 325 TABLET ORAL at 08:03

## 2017-03-14 RX ADMIN — VITAMIN D, TAB 1000IU (100/BT) 4000 UNITS: 25 TAB at 06:03

## 2017-03-14 RX ADMIN — PROMETHAZINE HYDROCHLORIDE 6.25 MG: 25 INJECTION, SOLUTION INTRAMUSCULAR; INTRAVENOUS at 08:03

## 2017-03-14 RX ADMIN — IPRATROPIUM BROMIDE AND ALBUTEROL SULFATE 3 ML: .5; 3 SOLUTION RESPIRATORY (INHALATION) at 07:03

## 2017-03-14 RX ADMIN — MAGNESIUM SULFATE HEPTAHYDRATE 3 G: 500 INJECTION, SOLUTION INTRAMUSCULAR; INTRAVENOUS at 08:03

## 2017-03-14 RX ADMIN — PANTOPRAZOLE SODIUM 40 MG: 40 TABLET, DELAYED RELEASE ORAL at 08:03

## 2017-03-14 RX ADMIN — HYDROMORPHONE HYDROCHLORIDE 0.5 MG: 1 INJECTION, SOLUTION INTRAMUSCULAR; INTRAVENOUS; SUBCUTANEOUS at 02:03

## 2017-03-14 RX ADMIN — Medication 3 ML: at 06:03

## 2017-03-14 RX ADMIN — POTASSIUM CHLORIDE 60 MEQ: 1500 TABLET, EXTENDED RELEASE ORAL at 08:03

## 2017-03-14 NOTE — PLAN OF CARE
12:10 PM Unable to complete discharge assessment due to patient has already discharged to home w/no needs.        03/14/17 1210   Discharge Assessment   Assessment Type Discharge Planning Assessment   Confirmed/corrected address and phone number on facesheet? No   Assessment information obtained from? Medical Record   Expected Length of Stay (days) (1)   Communicated expected length of stay with patient/caregiver no  (Per mD)   Type of Healthcare Directive Received (Unknown)   Prior to hospitilization cognitive status: Alert/Oriented;No Deficits   Current cognitive status: Alert/Oriented;No Deficits   Arrived From home or self-care   Lives With spouse   Able to Return to Prior Arrangements yes   Is patient able to care for self after discharge? Yes   How many people do you have in your home that can help with your care after discharge? 1   Who are your caregiver(s) and their phone number(s)? (Spouse: Chacho NORMAN [phone])   Patient's perception of discharge disposition home or selfcare   Readmission Within The Last 30 Days unable to assess   Does the patient have transportation to healthcare appointments? Yes   Transportation Available car;family or friend will provide   Discharge Plan A Home with family   Discharge Plan B Home with family   Patient/Family In Agreement With Plan yes  (Per floor nurse & NP)

## 2017-03-14 NOTE — PLAN OF CARE
Problem: Occupational Therapy Goal  Goal: Occupational Therapy Goal  Outcome: Outcome(s) achieved Date Met:  03/14/17  OT evaluation completed. Pt was mod (I) with ADLs and mobility during evaluation and presents close to PLOF baseline. She has been fully educated on safety at home and bathroom and has no further questions for skilled OT services at this time. Pt will be discharged from acute OT services at this time.      SEBASTIÁN Miller  3/14/2017

## 2017-03-14 NOTE — PLAN OF CARE
Problem: Patient Care Overview  Goal: Plan of Care Review  Outcome: Ongoing (interventions implemented as appropriate)  Pt remained free from injury over night. Pt reports pain in abdomen to be a 7/10 after movement. PRN medications given. Ambulates to restroom with assistance. Vital signs remained WNL. No acute changes occurred. IV fluids continue at 125ml/h.

## 2017-03-14 NOTE — PT/OT/SLP EVAL
Physical Therapy  Evaluation/Discharge     Citlali Rose   MRN: [id number]   Admitting Diagnosis: Malignant neoplasm of head of pancreas    PT Received On: 03/14/17  PT Start Time: 1026     PT Stop Time: 1038    PT Total Time (min): 12 min       Billable Minutes:  Evaluation  12 minutes    Diagnosis: Malignant neoplasm of head of pancreas     Past Medical History:   Diagnosis Date    Abdominal pain     Blood glucose elevated     Cancer     Decreased appetite     Diabetes mellitus, type 2     Dilation of biliary tract     Elevated LFTs     Gastritis     Hepatic steatosis     HTN (hypertension)     Hyperbilirubinemia     Hypercholesterolemia     Migraine headache     Nausea & vomiting     Obstructive jaundice     Pancreatic mass     Scleral icterus     Scleroderma     Weight loss, unintentional       Past Surgical History:   Procedure Laterality Date    CHOLECYSTECTOMY      TOTAL ABDOMINAL HYSTERECTOMY W/ BILATERAL SALPINGOOPHORECTOMY      TUBAL LIGATION         Referring physician: DREW Winston  Date referred to PT: 3/13/17    General Precautions: Standard, fall  Orthopedic Precautions: N/A   Braces: N/A       Do you have any cultural, spiritual, Anglican conflicts, given your current situation?: None stated     Patient History:  Lives With: spouse  Living Arrangements: house  Home Accessibility: stairs to enter home  Number of Stairs to Enter Home: 15  Stair Railings at Home: outside, present on right side  Living Environment Comment: Per pt, pt lives with  in raised house with ~15 PAOLO with R handrail. Bathroom has walk-in shower with no equipment although pt states she can borrow shower chair from brother. PTA, pt was (I) with functional mobility and ADLs. Pt was not driving since July 2016 when she started chemotherapy. Pt's  able to provide 24/7 assistance.   Equipment Currently Used at Home: none    Previous Level of Function:  Ambulation Skills: independent  Transfer Skills:  "independent  ADL Skills: independent  Work/Leisure Activity: independent    Subjective:  Communicated with RN prior to session.  Pt agreeable to PT/OT evaluation. Pt states "I think I'm going home today."   Chief Complaint: fatigue   Patient goals: return home    Pain Ratin/10   Pain Rating Post-Intervention: 0/10    Objective:   Patient found with: peripheral IV     Cognitive Exam:  Oriented to: Person, Place, Time and Situation    Follows Commands/attention: Follows multistep  commands  Communication: clear/fluent  Safety awareness/insight to disability: intact    Physical Exam:  Postural examination/scapula alignment: No postural abnormalities identified    Skin integrity: Visible skin intact  Edema: None noted in BLE    Sensation:   Intact    Lower Extremity Range of Motion:  Right Lower Extremity: WFL  Left Lower Extremity: WFL    Lower Extremity Strength:  Right Lower Extremity: WFL  Left Lower Extremity: WFL     Fine motor coordination:  Intact    Gross motor coordination: WFL    Functional Mobility:  Bed Mobility:  Scooting/Bridging: Modified Independent  Supine to Sit: Modified Independent    Transfers:  Sit <> Stand Assistance: Modified Independent  Sit <> Stand Assistive Device: No Assistive Device  Bed <> Chair Technique: Stand Pivot  Bed <> Chair Assistance: Modified Independent  Bed <> Chair Assistive Device: No Assistive Device  Toilet Transfer Technique: Stand Pivot  Toilet Transfer Assistance: Modified Independent  Toilet Transfer Assistive Device: No Assistive Device    Gait:   Gait Distance: 200'   Assistance 1: Modified Independent  Gait Assistive Device: No device  Gait Pattern: reciprocal  Gait Deviation(s): decreased step length, decreased peggy    Stairs:  Pt ascended/descend 8 stair(s) with No Assistive Device with right with Modified Independent.     Balance:   Static Sit: NORMAL: No deviations seen in posture held statically  Dynamic Sit: NORMAL: No deviations seen in posture held " dynamically  Static Stand: GOOD+: Takes MAXIMAL challenges from all directions  Dynamic stand: GOOD+: Independent gait (with or without assistive device)    Therapeutic Activities and Exercises:  Pt educated on role of PT and  as well as safety with mobility. Pt verbalized understanding. Pt expressed no further concerns/questions.     AM-PAC 6 CLICK MOBILITY  How much help from another person does this patient currently need?   1 = Unable, Total/Dependent Assistance  2 = A lot, Maximum/Moderate Assistance  3 = A little, Minimum/Contact Guard/Supervision  4 = None, Modified [place]/Independent    Turning over in bed (including adjusting bedclothes, sheets and blankets)?: 4  Sitting down on and standing up from a chair with arms (e.g., wheelchair, bedside commode, etc.): 4  Moving from lying on back to sitting on the side of the bed?: 4  Moving to and from a bed to a chair (including a wheelchair)?: 4  Need to walk in hospital room?: 4  Climbing 3-5 steps with a railing?: 4  Total Score: 24     AM-PAC Raw Score CMS G-Code Modifier Level of Impairment Assistance   6 % Total / Unable   7 - 9 CM 80 - 100% Maximal Assist   10 - 14 CL 60 - 80% Moderate Assist   15 - 19 CK 40 - 60% Moderate Assist   20 - 22 CJ 20 - 40% Minimal Assist   23 CI 1-20% SBA / CGA   24 CH 0% Independent/ Mod I     Patient left up in chair with all lines intact, call button in reach and  present.    Assessment:   Citlali Rose is a 56 y.o. female with a medical diagnosis of Malignant neoplasm of head of pancreas. PT evaluation complete and no goals established. Pt is functioning at high level and does not required acute care physical therapy services. Education provided to ambulate in hallway 3x/day with family in order to maintain strength and endurance. Education on home safety as well. Pt verbalized understanding. Please re-consult if functional mobility changes. Anticipate d/c to home; no needs.     Rehab identified  problem list/impairments: Rehab identified problem list/impairments: impaired endurance, impaired functional mobilty    Rehab potential is good.    Activity tolerance: Good    Discharge recommendations: Discharge Facility/Level Of Care Needs: home     Barriers to discharge: Barriers to Discharge: Inaccessible home environment    Equipment recommendations: Equipment Needed After Discharge: none     GOALS: no goals established     PLAN:  Discharge   Plan of Care reviewed with: patient, spouse        Tawana Adkins, PT  03/14/2017

## 2017-03-14 NOTE — PT/OT/SLP EVAL
Occupational Therapy  Evaluation/ Discharge Summary    Citlali Rose   MRN: [id number]   Admitting Diagnosis: Malignant neoplasm of head of pancreas    OT Date of Treatment: 03/14/17   OT Start Time: 1025  OT Stop Time: 1039  OT Total Time (min): 14 min    Billable Minutes:  Evaluation 14    Diagnosis: Malignant neoplasm of head of pancreas       Past Medical History:   Diagnosis Date    Abdominal pain     Blood glucose elevated     Cancer     Decreased appetite     Diabetes mellitus, type 2     Dilation of biliary tract     Elevated LFTs     Gastritis     Hepatic steatosis     HTN (hypertension)     Hyperbilirubinemia     Hypercholesterolemia     Migraine headache     Nausea & vomiting     Obstructive jaundice     Pancreatic mass     Scleral icterus     Scleroderma     Weight loss, unintentional       Past Surgical History:   Procedure Laterality Date    CHOLECYSTECTOMY      TOTAL ABDOMINAL HYSTERECTOMY W/ BILATERAL SALPINGOOPHORECTOMY      TUBAL LIGATION         Referring physician: Marcelle Graham NP  Date referred to OT: 3/13/2017    General Precautions: Standard, fall  Orthopedic Precautions: N/A  Braces: N/A    Do you have any cultural, spiritual, [employer]aritan conflicts, given your current situation?: none stated     Patient History:  Living Environment  Lives With: spouse  Living Arrangements: house  Home Accessibility: stairs to enter home  Home Layout: Able to live on 1st floor  Stair Railings at Home: outside, present on right side  Transportation Available: family or friend will provide  Living Environment Comment: Pt reported living in a raised in a SSH with 15 PAOLO with R HR; bathroom has a walkin shower and can borrow shower chair if needed. PTA, pt was (I) with all ADLs and mobility.  is able to assist upon d/c.   Equipment Currently Used at Home: none    Prior level of function:   Bed Mobility/Transfers: independent  Grooming: independent  Bathing: independent  Upper  Body Dressing: independent  Lower Body Dressing: independent  Toileting: independent        Dominant hand: right    Subjective:  Communicated with RN prior to session.    Pt agreeable to OT/PT session.    Chief Complaint: being itchy  Patient/Family stated goals: return home    Pain Ratin/10  Pain Rating Post-Intervention: 0/10    Objective:  Patient found with: telemetry, peripheral IV, SCD    Cognitive Exam:  Oriented to: Person, Place, Time and Situation  Follows Commands/attention: Follows multistep  commands  Communication: clear/fluent  Memory:  No Deficits noted  Safety awareness/insight to disability: intact  Coping skills/emotional control: Appropriate to situation    Visual/perceptual:  Intact    Physical Exam:  Postural examination/scapula alignment: Rounded shoulder  Skin integrity: Visible skin intact  Edema: None noted in BUE    Sensation:   Intact in BUE    Upper Extremity Range of Motion:  Right Upper Extremity: WFL  Left Upper Extremity: WFL    Upper Extremity Strength:  Right Upper Extremity: WFL  Left Upper Extremity: WFL   Strength: good    Fine motor coordination:   Intact    Gross motor coordination: WFL    Functional Mobility:  Bed Mobility: HOB flat  Scooting/Bridging: Modified Independent  Supine to Sit: Modified Independent    Transfers:  Sit <> Stand Assistance: Modified Independent (1 trial from EOB)  Sit <> Stand Assistive Device: No Assistive Device  Bed <> Chair Technique: Stand Pivot  Bed <> Chair Transfer Assistance: Modified Independent  Bed <> Chair Assistive Device: No Assistive Device  Toilet Transfer Technique: Stand Pivot  Toilet Transfer Assistance: Modified Independent  Toilet Transfer Assistive Device: grab bar    Functional Ambulation: over ~100 ft with mod (I) using no AD, ascended/descended 4 steps with 2 trials using R HR. Distance limited due to IV pole.     Activities of Daily Living:    LE Dressing Level of Assistance: Modified independent (for donning depends  "and pants while standing near toilet)    Grooming Position: Standing at sink  Grooming Level of Assistance: Modified independent (washing hands from sink)  Toileting Where Assessed: Toilet  Toileting Level of Assistance: Modified independent (for david hygiene and LE clothing management while standing)    Balance:   Static Sit: GOOD: Takes MODERATE challenges from all directions  Dynamic Sit: GOOD: Maintains balance through MODERATE excursions of active trunk movement  Static Stand: GOOD: Takes MODERATE challenges from all directions  Dynamic stand: GOOD: Needs SUPERVISION only during gait and able to self right with moderate     Therapeutic Activities and Exercises:  Pt educated on OT role/discharge, safety at home/bathroom whiteboard updated, re-orientation to call light and safety UIC using call light. Verbalized understanding.       AM-PAC 6 CLICK ADL  How much help from another person does this patient currently need?  1 = Unable, Total/Dependent Assistance  2 = A lot, Maximum/Moderate Assistance  3 = A little, Minimum/Contact Guard/Supervision  4 = None, Modified [place]/Independent    Putting on and taking off regular lower body clothing? : 4  Bathing (including washing, rinsing, drying)?: 3  Toileting, which includes using toilet, bedpan, or urinal? : 4  Putting on and taking off regular upper body clothing?: 4  Taking care of personal grooming such as brushing teeth?: 4  Eating meals?: 4  Total Score: 23    AM-PAC Raw Score CMS "G-Code Modifier Level of Impairment Assistance   6 % Total / Unable   7 - 9 CM 80 - 100% Maximal Assist   10 - 14 CL 60 - 80% Moderate Assist   15 - 19 CK 40 - 60% Moderate Assist   20 - 22 CJ 20 - 40% Minimal Assist   23 CI 1-20% SBA / CGA   24 CH 0% Independent/ Mod I       Patient left up in chair with all lines intact, call button in reach and RN notified, spouse    Assessment:  Citlali Rose is a 56 y.o. female with a medical diagnosis of Malignant neoplasm of " head of pancreas. Session tolerated well with no c/o of pain or fatigue. Pt was mod (I) with ADLs and transfers in hospital setting and presents close to PLOF baseline. SHe has been fully educated on safety at home and bathroom, verbalizing understanding with no further questions. Additionally, pt was educated on she will be discharge from OT and will no longer be followed by service, she verbalized understanding. Pt will be discharged from acute OT services at this time with the recommendation of home no needs.    Pt presented with a low complexity OT evaluation.  Pt required a brief an expanded review of medical history and occupational profile.  Pt demod 1-3 performance deficits (physical, cognitive, or psychosocial) resulting in limitations and engagement restrictions.  Clinical decision making required low analytical complexity with limited treatment options.  Pt with stable clinical presentation from OT's standpoint with no modification of task/assistance with assessment.      Physical- skills refer to impairments of body structure or functions, balance, mobility; strength, endurance, FMC, GMC, sensation, dexterity, and posture.  Cognitive- skills refer to ability to attend, communicate, perceive, think, understand, problem solve, mentally sequence, learn, and remember resulting in ability to organize occupational performance in timely and safe manner.    Psychosocial- skills refer to interpersonal interactions, habits, routines, and behaviors, active use of coping strategies, and environmental adaptations to appropriately participate in everyday tasks and situations.       Discharge recommendations: Discharge Facility/Level Of Care Needs: home     Barriers to discharge: Barriers to Discharge: Inaccessible home environment    Equipment recommendations: none     GOALS:   Occupational Therapy Goals     Not on file      Multidisciplinary Problems (Resolved)        Problem: Occupational Therapy Goal    Goal  Priority Disciplines Outcome Interventions   Occupational Therapy Goal   (Resolved)     OT, PT/OT Outcome(s) achieved              PLAN:  DC from skilled OT services       SEBASTIÁN Joya  03/14/2017

## 2017-03-14 NOTE — PROGRESS NOTES
SURGERY  Progress Note    Hospital Day: 2  Admit Date: 3/13/2017    Date of Surgery:  3/13/2017  Post-Operative Day #:  1 Day Post-Op    Procedure:  Procedure(s):  LAPAROSCOPY-DIAGNOSTIC (N/A)    SUBJECTIVE:     No acute events overnight. Pain controlled. One episode of emesis.    Current Medications:   albuterol-ipratropium 2.5mg-0.5mg/3mL  3 mL Nebulization Q6H WAKE    enoxaparin  40 mg Subcutaneous Q24H    fentaNYL  1 patch Transdermal Q72H    magnesium sulfate IVPB  3 g Intravenous Once    pantoprazole  40 mg Oral Daily    sodium chloride 0.9%  3 mL Intravenous Q8H    vitamin D  4,000 Units Oral QAM     Infusions:   PRN:acetaminophen, dextrose 50%, dextrose 50%, diphenhydrAMINE, glucagon (human recombinant), glucose, glucose, insulin aspart, oxycodone-acetaminophen, promethazine (PHENERGAN) IVPB, ramelteon, sodium chloride 0.9%    Review of patient's allergies indicates:   Allergen Reactions    Zofran [ondansetron hcl (pf)] Nausea And Vomiting     Made the nausea worse       OBJECTIVE:     Most Recent Vitals:  Temp: 98.1 °F (36.7 °C) (03/14/17 0745)  Pulse: 99 (03/14/17 0745)  Resp: 16 (03/14/17 0745)  BP: (!) 117/57 (03/14/17 0745)  SpO2: 98 % (03/14/17 0745)    24-Hour Vitals Range:  Temp:  [97.7 °F (36.5 °C)-98.6 °F (37 °C)]   Pulse:  []   Resp:  [13-20]   BP: ()/(50-82)   SpO2:  [93 %-100 %]     24-Hour I&O:  Intake/Output Summary (Last 24 hours) at 03/14/17 0959  Last data filed at 03/14/17 0703   Gross per 24 hour   Intake          3330.25 ml   Output               75 ml   Net          3255.25 ml       PHYSICAL EXAM:  AAO, NAD, thin  Head normocephalic, atraumatic  Trachea midline, neck supple  Respirations unlabored with good inspiratory effort  Heart regular rate and rhythm  Abdomen soft,  nondistended, expected post op tenderness. Incisions c/d/i    LAB RESULTS:    Recent Labs  Lab 03/14/17  0406 03/14/17  0827   WBC 2.69* 4.11   HGB 7.4* 9.3*   HCT 22.3* 28.1*   PLT 93* 141*      Recent Labs  Lab 03/14/17  0406      K 3.1*      CO2 26   BUN 3*   CREATININE 0.5   GLU 92   CALCIUM 7.6*   MG 1.2*   PHOS 3.3   No results for input(s): INR, PTT, LABHEPA, LACTATE, TROPONINI, CPK, CPKMB, MB, BNP in the last 72 hours.No results for input(s): PH, PCO2, PO2, HCO3 in the last 72 hours.      ASSESSMENT:     Citlali Rose is a 56 y.o. female 1 Day Post-Op s/p diagnostic laparoscopy who was admitted overnight for pain control    Plan:  - Home today with appropriate pain medication  - f/u in clinic    Alea Rahman MD, PGY-1  General Surgery  [phone]

## 2017-03-14 NOTE — DISCHARGE SUMMARY
[facility]  General Surgery  Discharge Summary      Patient Name: Citlali Rose  MRN: [id number]  Admission Date: 3/13/2017  Hospital Length of Stay: 1 days  Discharge Date and Time:  03/14/2017 11:33 AM  Attending Physician: EMMANUELLE Winston MD   Discharging Provider: Marcelle Graham NP  Primary Care Provider: Cristobal Cabral MD     HPI: Mrs Rose is a pleasant 56 y.o. female who is accompanied by her  after being referred to me by Dr. Winston for prehab, nutritional and functional improvement. She is now s/p neoadjuvant chemo and chemoradiation, last dose Jan 4. We have been working on controlling nausea, getting weight back on her, increasing nutrition and activity and supplementation. In the last week prior to this, she had increased nausea again and more vomiting and has still been trying to move and progress but says she is quite fatigued. She is not able to take in much food or protein, even shakes as she states she can't tolerate it. Her nutritional labs also indicated that she is not progressing.      We discussed that Dr. Winston will start the procedure w an exploratory laparoscopy and if needed, laparotomy. If there is no sign of metastasis, then he will proceed w the surgery. We had a cipriano discussion about the probability of her cancer advancing as we have not been able to make any real progress in her functional and nutritional status. I advised her that this was a worrisome finding. She understands as does Mr. Rose and they expressed understanding.     Past Medical History:   Diagnosis Date    Abdominal pain     Blood glucose elevated     Cancer     Decreased appetite     Diabetes mellitus, type 2     Dilation of biliary tract     Elevated LFTs     Gastritis     Hepatic steatosis     HTN (hypertension)     Hyperbilirubinemia     Hypercholesterolemia     Migraine headache     Nausea & vomiting     Obstructive jaundice     Pancreatic mass     Scleral  icterus     Scleroderma     Weight loss, unintentional      Past Surgical History:   Procedure Laterality Date    CHOLECYSTECTOMY      TOTAL ABDOMINAL HYSTERECTOMY W/ BILATERAL SALPINGOOPHORECTOMY      TUBAL LIGATION       Patient Active Problem List   Diagnosis    Obstructive jaundice    HTN (hypertension)    Hypercholesterolemia    Diabetes mellitus, type 2    Diabetes mellitus type 2, diet-controlled    Protein calorie malnutrition    Pancreatic insufficiency    s/p diagnostic lap, stage IV pancreatic ca 3/13    Vitamin D deficiency    Malnutrition    Malignant neoplasm of pancreas     Vitals:    03/14/17 0745   BP: (!) 117/57   Pulse: 99   Resp: 16   Temp: 98.1 °F (36.7 °C)     Procedure(s) (LRB):  LAPAROSCOPY-DIAGNOSTIC (N/A)     DATE OF PROCEDURE: 03/13/2017.     POSTOPERATIVE DIAGNOSIS: Pancreatic cancer.     POSTOPERATIVE DIAGNOSIS: Pancreatic cancer metastatic to the liver.     PROCEDURES PERFORMED:  1. Diagnostic laparoscopy with evacuation of ascites.  2. Wedge biopsy of segment 5 of the liver.     SURGEON: Benito Winston M.D.     ASSISTANT: Dru Brady M.D. (RES).     ANESTHESIA: General endotracheal.     FINDINGS: Frozen section on segment 5. Biopsy was positive for adenocarcinoma.    Hospital Course: The patient is tolerating a regular diet.  She is voiding and ambulating without difficulty.  Patient with no complaints of nausea, vomiting, chest pain or shortness of breath.  Her vital signs stable. She is afebrile. She is positive for flatus and positive for bowel sounds.  CV: RRR  Lungs: CTA bilaterally  Abdomen:  Soft, non-tender, non-distended, positive for bowel sounds, incision clean, dry and intact.    Consults:     Significant Diagnostic Studies: Labs:   CMP   Recent Labs  Lab 03/14/17  0406      K 3.1*      CO2 26   GLU 92   BUN 3*   CREATININE 0.5   CALCIUM 7.6*   ANIONGAP 7*   ESTGFRAFRICA >60.0   EGFRNONAA >60.0    and CBC   Recent Labs  Lab  03/14/17  0406 03/14/17  0827   WBC 2.69* 4.11   HGB 7.4* 9.3*   HCT 22.3* 28.1*   PLT 93* 141*       Pending Diagnostic Studies:     None        Final Active Diagnoses:    Diagnosis Date Noted POA    PRINCIPAL PROBLEM:  s/p diagnostic lap, stage IV pancreatic ca 3/13 [C25.0] 02/03/2017 Yes      Problems Resolved During this Admission:    Diagnosis Date Noted Date Resolved POA      Discharged Condition: good    Disposition: Home or Self Care    Follow Up:  Follow-up Information     Follow up with DREW Winston MD In 2 weeks.    Specialty:  General Surgery    Why:  Office to call you w/appt.     Contact information:    [address]  [facility] [address] [phone]          Patient Instructions:     Diet general     Diet general     Activity as tolerated     Lifting restrictions     Call MD for:  temperature >100.4     Call MD for:  persistent nausea and vomiting or diarrhea     Call MD for:  redness, tenderness, or signs of infection (pain, swelling, redness, odor or green/yellow discharge around incision site)     Call MD for:  severe uncontrolled pain     No dressing needed   Order Comments: Ok to shower, wash wound with soap and water, keep incision clean and dry at all times, no swimming, no tub bathing.     Lifting restrictions   Order Comments: Do not lift more than 10lbs for 6 weeks     Call MD for:  temperature >100.4     Call MD for:  persistent nausea and vomiting or diarrhea     Call MD for:  severe uncontrolled pain     Call MD for:  redness, tenderness, or signs of infection (pain, swelling, redness, odor or green/yellow discharge around incision site)     Call MD for:  difficulty breathing or increased cough     Call MD for:  severe persistent headache     Call MD for:  worsening rash     Call MD for:  persistent dizziness, light-headedness, or visual disturbances     Call MD for:  increased confusion or weakness     No dressing needed     Incentive spirometry   Standing Status: Future   Standing Exp. Date: 05/08/18       Medications:  Reconciled Home Medications:   Current Discharge Medication List      START taking these medications    Details   fentaNYL (DURAGESIC) 50 mcg/hr Place 1 patch onto the skin every 72 hours.  Qty: 10 patch, Refills: 0         CONTINUE these medications which have CHANGED    Details   !! oxycodone-acetaminophen (PERCOCET) 5-325 mg per tablet Take 2 tablets by mouth every 4 (four) hours as needed.  Qty: 50 tablet, Refills: 0       !! - Potential duplicate medications found. Please discuss with provider.      CONTINUE these medications which have NOT CHANGED    Details   cholecalciferol, vitamin D3, (VITAMIN D3) 2,000 unit Tab Take 2 tablets by mouth every morning. Takes two 2000 mg tablets (4000 units) every morning      insulin lispro (HUMALOG) 100 unit/mL injection Inject into the skin 3 (three) times daily before meals. Uses a Sliding Scale which starts at 200, Uses ~ once a month      lipase-protease-amylase 24,000-76,000-120,000 units (CREON) 24,000-76,000 -120,000 unit capsule Take 2 capsules by mouth 3 (three) times daily with meals. Take 1 capsule w snacks  Qty: 270 capsule, Refills: 11    Associated Diagnoses: Pancreatic insufficiency      multivitamin capsule Take 1 capsule by mouth every morning. Takes a chewable multivitamin every morning      !! oxycodone-acetaminophen (PERCOCET) 5-325 mg per tablet Take 1 tablet by mouth every 6 (six) hours as needed for Pain.  Qty: 40 tablet, Refills: 0      pantoprazole (PROTONIX) 40 MG tablet Take 1 tablet (40 mg total) by mouth once daily.  Qty: 30 tablet, Refills: 11      promethazine (PHENERGAN) 12.5 MG Tab Take 2 tablets (25 mg total) by mouth every 6 (six) hours as needed.  Qty: 60 tablet, Refills: 1       !! - Potential duplicate medications found. Please discuss with provider.          Marcelle Graham NP  General Surgery  [facility]  [phone]

## 2017-03-14 NOTE — NURSING
Patient IV removed. Patient tolerated well. Patient discharge teaching completed. Verbalized understanding. Paper prescriptions given to patient. Awaiting patient escort to transport to [facility].

## 2017-03-14 NOTE — PLAN OF CARE
Problem: Physical Therapy Goal  Goal: Physical Therapy Goal  Outcome: Outcome(s) achieved Date Met:  03/14/17  PT evaluation complete and no goals established. Pt is functioning at high level and does not required acute care physical therapy services. Education provided to ambulate in hallway 3x/day with family in order to maintain strength and endurance. Education on home safety as well. Pt verbalized understanding. Please re-consult if functional mobility changes. Anticipate d/c to home; no needs.      Tawana Adkins DPT, PT  3/14/2017

## 2017-03-14 NOTE — PT/OT/SLP DISCHARGE
Physical Therapy Discharge Summary    Citlali Rose  MRN: [id number]   Malignant neoplasm of head of pancreas   Patient Discharged from acute Physical Therapy on 3/14/17.  Please refer to prior PT noted date on 3/14/17 for functional status.     Assessment:   Patient appropriate for care in another setting.  GOALS:   Physical Therapy Goals     Not on file      Multidisciplinary Problems (Resolved)        Problem: Physical Therapy Goal    Goal Priority Disciplines Outcome Goal Variances Interventions   Physical Therapy Goal   (Resolved)     PT/OT, PT Outcome(s) achieved             Reasons for Discontinuation of Therapy Services  Transfer to alternate level of care.      Plan:  Patient Discharged to: Home no PT services needed.    Tawana Adkins, MINO, PT  3/14/2017

## 2017-03-15 NOTE — PLAN OF CARE
Patient discharged home with no needs 3/14/2017.  Follow up appt on AVS.       03/15/17 1132   Final Note   Assessment Type Final Discharge Note   Discharge Disposition Home   Right Care Referral Info   Post Acute Recommendation No Care

## 2017-03-17 LAB
BLD PROD TYP BPU: NORMAL
BLD PROD TYP BPU: NORMAL
BLOOD UNIT EXPIRATION DATE: NORMAL
BLOOD UNIT EXPIRATION DATE: NORMAL
BLOOD UNIT TYPE CODE: 9500
BLOOD UNIT TYPE CODE: 9500
BLOOD UNIT TYPE: NORMAL
BLOOD UNIT TYPE: NORMAL
CODING SYSTEM: NORMAL
CODING SYSTEM: NORMAL
DISPENSE STATUS: NORMAL
DISPENSE STATUS: NORMAL
TRANS ERYTHROCYTES VOL PATIENT: NORMAL ML
TRANS ERYTHROCYTES VOL PATIENT: NORMAL ML

## 2017-03-21 ENCOUNTER — OFFICE VISIT (OUTPATIENT)
Dept: SURGERY | Facility: CLINIC | Age: 57
End: 2017-03-21
Payer: COMMERCIAL

## 2017-03-21 VITALS
WEIGHT: 128 LBS | HEIGHT: 59 IN | TEMPERATURE: 98 F | BODY MASS INDEX: 25.8 KG/M2 | HEART RATE: 107 BPM | DIASTOLIC BLOOD PRESSURE: 70 MMHG | SYSTOLIC BLOOD PRESSURE: 112 MMHG

## 2017-03-21 DIAGNOSIS — Z09 SURGERY FOLLOW-UP: Primary | ICD-10-CM

## 2017-03-21 PROCEDURE — 99999 PR PBB SHADOW E&M-EST. PATIENT-LVL III: CPT | Mod: PBBFAC,,, | Performed by: SURGERY

## 2017-03-21 PROCEDURE — 99024 POSTOP FOLLOW-UP VISIT: CPT | Mod: S$GLB,,, | Performed by: SURGERY

## 2017-03-21 RX ORDER — OXYCODONE AND ACETAMINOPHEN 10; 325 MG/1; MG/1
1 TABLET ORAL EVERY 4 HOURS PRN
Qty: 70 TABLET | Refills: 0 | Status: SHIPPED | OUTPATIENT
Start: 2017-03-21 | End: 2017-03-21 | Stop reason: SDUPTHER

## 2017-03-21 RX ORDER — FENTANYL 50 UG/1
1 PATCH TRANSDERMAL
Qty: 10 PATCH | Refills: 0 | Status: SHIPPED | OUTPATIENT
Start: 2017-03-21 | End: 2017-03-21 | Stop reason: DRUGHIGH

## 2017-03-21 RX ORDER — FENTANYL 75 UG/H
1 PATCH TRANSDERMAL
Qty: 10 PATCH | Refills: 0 | Status: ON HOLD | OUTPATIENT
Start: 2017-03-21 | End: 2017-04-14 | Stop reason: HOSPADM

## 2017-03-21 RX ORDER — OXYCODONE AND ACETAMINOPHEN 10; 325 MG/1; MG/1
1 TABLET ORAL EVERY 4 HOURS PRN
Qty: 70 TABLET | Refills: 0 | Status: SHIPPED | OUTPATIENT
Start: 2017-03-21

## 2017-03-21 NOTE — LETTER
[facility] - Gen Surg/Surg Onc  [address]  [facility] [address]  Phone: [phone] March 23, 2017        Cristobal Garrison MD  [address]  [address]  [facility] [address]    Patient: Citlali Rose   MR Number: [id number]   YOB: 1960   Date of Visit: 3/21/2017     Dear Dr. Garrison:    Thank you for referring Citlali Rose to me for evaluation.     I was unable to improve Ms. Rose's nutrition to where I thought it was safe to plan for a major pancreatic resection.  Normally in these situations, it is because the patient has more advanced disease than I realize.  We took Ms. Rose for a diagnostic laparoscopy, and, unfortunately, that is what we found, in the form of a small liver met.  Ms. Rose is now back home, doing fine.  I will see her as needed and asked her to see you in follow-up.    If you have questions, please do not hesitate to call me. Thank you again for referring Ms. Rose.  I'm sorry we could not do more for her.    Sincerely,      EMMANUELLE Winston MD    [phone] (cell)  Surgical Oncology & General Surgery  [facility]    Enclosure

## 2017-04-10 PROBLEM — D64.9 ANEMIA: Status: ACTIVE | Noted: 2017-04-10

## 2017-04-10 PROBLEM — E87.6 HYPOKALEMIA: Status: ACTIVE | Noted: 2017-04-10

## 2017-04-10 PROBLEM — C25.2 CANCER OF PANCREAS, TAIL: Status: ACTIVE | Noted: 2017-04-10

## 2017-04-11 PROBLEM — E87.6 HYPOKALEMIA: Status: RESOLVED | Noted: 2017-04-10 | Resolved: 2017-04-11

## 2017-04-12 PROBLEM — R11.2 NAUSEA VOMITING AND DIARRHEA: Status: ACTIVE | Noted: 2017-04-12

## 2017-04-12 PROBLEM — R19.7 NAUSEA VOMITING AND DIARRHEA: Status: ACTIVE | Noted: 2017-04-12

## 2017-04-13 PROBLEM — K92.2 GI BLEED: Status: ACTIVE | Noted: 2017-04-13

## 2017-04-14 PROBLEM — K92.2 GI BLEED: Status: RESOLVED | Noted: 2017-04-13 | Resolved: 2017-04-14

## 2017-04-14 PROBLEM — R19.7 NAUSEA VOMITING AND DIARRHEA: Status: RESOLVED | Noted: 2017-04-12 | Resolved: 2017-04-14

## 2017-04-14 PROBLEM — R11.2 NAUSEA VOMITING AND DIARRHEA: Status: RESOLVED | Noted: 2017-04-12 | Resolved: 2017-04-14

## (undated) DEVICE — SEE MEDLINE ITEM 157117

## (undated) DEVICE — TUBING HF INSUFFLATION W/ FLTR

## (undated) DEVICE — ELECTRODE REM PLYHSV RETURN 9

## (undated) DEVICE — SEE MEDLINE ITEM 157128

## (undated) DEVICE — SCISSOR 5MMX35CM DIRECT DRIVE

## (undated) DEVICE — SEE MEDLINE ITEM 146372

## (undated) DEVICE — SOL NS 1000CC

## (undated) DEVICE — GOWN SURGICAL X-LARGE

## (undated) DEVICE — BLADE SURG CARBON STEEL SZ11

## (undated) DEVICE — KIT ANTIFOG

## (undated) DEVICE — NDL INSUF ULTRA VERESS 120MM

## (undated) DEVICE — NDL HYPO REG 25G X 1 1/2

## (undated) DEVICE — BANDAGE ADHESIVE

## (undated) DEVICE — TROCAR ENDOPATH XCEL 12MM 10CM

## (undated) DEVICE — TRAY MINOR GEN SURG

## (undated) DEVICE — SUT 0 VICRYL / UR6 (J603)

## (undated) DEVICE — APPLICATOR CHLORAPREP ORN 26ML

## (undated) DEVICE — TROCAR ENDOPATH XCEL 5MM 7.5CM

## (undated) DEVICE — DRAPE ABDOMINAL TIBURON 14X11

## (undated) DEVICE — SUT MCRYL PLUS 4-0 PS2 27IN

## (undated) DEVICE — DRAPE INCISE IOBAN 2 23X17IN

## (undated) DEVICE — ADHESIVE DERMABOND ADVANCED